# Patient Record
Sex: MALE | Race: WHITE | Employment: FULL TIME | URBAN - METROPOLITAN AREA
[De-identification: names, ages, dates, MRNs, and addresses within clinical notes are randomized per-mention and may not be internally consistent; named-entity substitution may affect disease eponyms.]

---

## 2017-01-04 ENCOUNTER — COMMUNICATION - HEALTHEAST (OUTPATIENT)
Dept: FAMILY MEDICINE | Facility: CLINIC | Age: 52
End: 2017-01-04

## 2017-01-04 DIAGNOSIS — K21.00 REFLUX ESOPHAGITIS: ICD-10-CM

## 2017-01-05 ENCOUNTER — COMMUNICATION - HEALTHEAST (OUTPATIENT)
Dept: FAMILY MEDICINE | Facility: CLINIC | Age: 52
End: 2017-01-05

## 2017-01-05 ENCOUNTER — AMBULATORY - HEALTHEAST (OUTPATIENT)
Dept: NURSING | Facility: CLINIC | Age: 52
End: 2017-01-05

## 2017-01-05 DIAGNOSIS — K21.00 REFLUX ESOPHAGITIS: ICD-10-CM

## 2017-01-05 DIAGNOSIS — I26.99 PULMONARY EMBOLISM (H): ICD-10-CM

## 2017-02-07 ENCOUNTER — COMMUNICATION - HEALTHEAST (OUTPATIENT)
Dept: NURSING | Facility: CLINIC | Age: 52
End: 2017-02-07

## 2017-02-14 ENCOUNTER — RECORDS - HEALTHEAST (OUTPATIENT)
Dept: ADMINISTRATIVE | Facility: OTHER | Age: 52
End: 2017-02-14

## 2017-02-14 ENCOUNTER — AMBULATORY - HEALTHEAST (OUTPATIENT)
Dept: LAB | Facility: CLINIC | Age: 52
End: 2017-02-14

## 2017-02-14 ENCOUNTER — COMMUNICATION - HEALTHEAST (OUTPATIENT)
Dept: NURSING | Facility: CLINIC | Age: 52
End: 2017-02-14

## 2017-02-14 DIAGNOSIS — I26.99 OTHER PULMONARY EMBOLISM AND INFARCTION: ICD-10-CM

## 2017-02-28 ENCOUNTER — OFFICE VISIT - HEALTHEAST (OUTPATIENT)
Dept: PODIATRY | Age: 52
End: 2017-02-28

## 2017-02-28 DIAGNOSIS — E11.9 TYPE 2 DIABETES MELLITUS WITHOUT COMPLICATION, WITHOUT LONG-TERM CURRENT USE OF INSULIN (H): ICD-10-CM

## 2017-02-28 DIAGNOSIS — M76.61 ACHILLES TENDINITIS OF RIGHT LOWER EXTREMITY: ICD-10-CM

## 2017-03-02 ENCOUNTER — COMMUNICATION - HEALTHEAST (OUTPATIENT)
Dept: FAMILY MEDICINE | Facility: CLINIC | Age: 52
End: 2017-03-02

## 2017-03-02 DIAGNOSIS — I26.99 PULMONARY EMBOLISM (H): ICD-10-CM

## 2017-03-02 DIAGNOSIS — Z79.01 LONG TERM (CURRENT) USE OF ANTICOAGULANTS: ICD-10-CM

## 2017-03-07 ENCOUNTER — COMMUNICATION - HEALTHEAST (OUTPATIENT)
Dept: FAMILY MEDICINE | Facility: CLINIC | Age: 52
End: 2017-03-07

## 2017-03-16 ENCOUNTER — COMMUNICATION - HEALTHEAST (OUTPATIENT)
Dept: NURSING | Facility: CLINIC | Age: 52
End: 2017-03-16

## 2017-03-16 DIAGNOSIS — I26.99 PULMONARY EMBOLISM (H): ICD-10-CM

## 2017-03-31 ENCOUNTER — COMMUNICATION - HEALTHEAST (OUTPATIENT)
Dept: FAMILY MEDICINE | Facility: CLINIC | Age: 52
End: 2017-03-31

## 2017-03-31 DIAGNOSIS — I26.99 PULMONARY EMBOLISM (H): ICD-10-CM

## 2017-03-31 DIAGNOSIS — K21.00 REFLUX ESOPHAGITIS: ICD-10-CM

## 2017-04-18 ENCOUNTER — AMBULATORY - HEALTHEAST (OUTPATIENT)
Dept: LAB | Facility: CLINIC | Age: 52
End: 2017-04-18

## 2017-04-18 ENCOUNTER — COMMUNICATION - HEALTHEAST (OUTPATIENT)
Dept: NURSING | Facility: CLINIC | Age: 52
End: 2017-04-18

## 2017-04-18 DIAGNOSIS — I26.99 PULMONARY EMBOLISM (H): ICD-10-CM

## 2017-06-06 ENCOUNTER — COMMUNICATION - HEALTHEAST (OUTPATIENT)
Dept: NURSING | Facility: CLINIC | Age: 52
End: 2017-06-06

## 2017-06-07 ENCOUNTER — AMBULATORY - HEALTHEAST (OUTPATIENT)
Dept: FAMILY MEDICINE | Facility: CLINIC | Age: 52
End: 2017-06-07

## 2017-06-07 ENCOUNTER — COMMUNICATION - HEALTHEAST (OUTPATIENT)
Dept: NURSING | Facility: CLINIC | Age: 52
End: 2017-06-07

## 2017-06-07 ENCOUNTER — AMBULATORY - HEALTHEAST (OUTPATIENT)
Dept: LAB | Facility: CLINIC | Age: 52
End: 2017-06-07

## 2017-06-07 DIAGNOSIS — E55.9 VITAMIN D DEFICIENCY: ICD-10-CM

## 2017-06-07 DIAGNOSIS — I26.99 PULMONARY EMBOLISM (H): ICD-10-CM

## 2017-06-10 ENCOUNTER — COMMUNICATION - HEALTHEAST (OUTPATIENT)
Dept: FAMILY MEDICINE | Facility: CLINIC | Age: 52
End: 2017-06-10

## 2017-07-26 ENCOUNTER — COMMUNICATION - HEALTHEAST (OUTPATIENT)
Dept: NURSING | Facility: CLINIC | Age: 52
End: 2017-07-26

## 2017-08-01 ENCOUNTER — COMMUNICATION - HEALTHEAST (OUTPATIENT)
Dept: FAMILY MEDICINE | Facility: CLINIC | Age: 52
End: 2017-08-01

## 2017-08-01 DIAGNOSIS — Z79.01 LONG TERM (CURRENT) USE OF ANTICOAGULANTS: ICD-10-CM

## 2017-08-01 DIAGNOSIS — I26.99 PULMONARY EMBOLISM (H): ICD-10-CM

## 2017-08-03 ENCOUNTER — AMBULATORY - HEALTHEAST (OUTPATIENT)
Dept: LAB | Facility: CLINIC | Age: 52
End: 2017-08-03

## 2017-08-03 ENCOUNTER — COMMUNICATION - HEALTHEAST (OUTPATIENT)
Dept: NURSING | Facility: CLINIC | Age: 52
End: 2017-08-03

## 2017-08-03 DIAGNOSIS — I26.99 PULMONARY EMBOLISM (H): ICD-10-CM

## 2017-09-07 ENCOUNTER — COMMUNICATION - HEALTHEAST (OUTPATIENT)
Dept: NURSING | Facility: CLINIC | Age: 52
End: 2017-09-07

## 2017-09-22 ENCOUNTER — AMBULATORY - HEALTHEAST (OUTPATIENT)
Dept: LAB | Facility: CLINIC | Age: 52
End: 2017-09-22

## 2017-09-22 ENCOUNTER — COMMUNICATION - HEALTHEAST (OUTPATIENT)
Dept: NURSING | Facility: CLINIC | Age: 52
End: 2017-09-22

## 2017-09-22 DIAGNOSIS — I26.99 PULMONARY EMBOLISM (H): ICD-10-CM

## 2017-10-09 ENCOUNTER — AMBULATORY - HEALTHEAST (OUTPATIENT)
Dept: LAB | Facility: CLINIC | Age: 52
End: 2017-10-09

## 2017-10-09 ENCOUNTER — COMMUNICATION - HEALTHEAST (OUTPATIENT)
Dept: NURSING | Facility: CLINIC | Age: 52
End: 2017-10-09

## 2017-10-09 DIAGNOSIS — I26.99 PULMONARY EMBOLISM (H): ICD-10-CM

## 2017-10-30 ENCOUNTER — COMMUNICATION - HEALTHEAST (OUTPATIENT)
Dept: NURSING | Facility: CLINIC | Age: 52
End: 2017-10-30

## 2017-10-30 ENCOUNTER — AMBULATORY - HEALTHEAST (OUTPATIENT)
Dept: LAB | Facility: CLINIC | Age: 52
End: 2017-10-30

## 2017-10-30 DIAGNOSIS — I26.99 PULMONARY EMBOLISM (H): ICD-10-CM

## 2017-10-31 ENCOUNTER — COMMUNICATION - HEALTHEAST (OUTPATIENT)
Dept: FAMILY MEDICINE | Facility: CLINIC | Age: 52
End: 2017-10-31

## 2017-10-31 DIAGNOSIS — I26.99 PULMONARY EMBOLISM (H): ICD-10-CM

## 2017-11-03 ENCOUNTER — OFFICE VISIT - HEALTHEAST (OUTPATIENT)
Dept: FAMILY MEDICINE | Facility: CLINIC | Age: 52
End: 2017-11-03

## 2017-11-03 DIAGNOSIS — I26.99 PULMONARY EMBOLISM (H): ICD-10-CM

## 2017-11-03 DIAGNOSIS — K21.00 REFLUX ESOPHAGITIS: ICD-10-CM

## 2017-11-03 DIAGNOSIS — Z00.00 HEALTH CARE MAINTENANCE: ICD-10-CM

## 2017-11-03 DIAGNOSIS — E78.5 HYPERLIPIDEMIA: ICD-10-CM

## 2017-11-03 DIAGNOSIS — I10 ESSENTIAL HYPERTENSION: ICD-10-CM

## 2017-11-03 DIAGNOSIS — Z79.01 LONG TERM CURRENT USE OF ANTICOAGULANT THERAPY: ICD-10-CM

## 2017-11-03 DIAGNOSIS — E11.9 TYPE II DIABETES MELLITUS (H): ICD-10-CM

## 2017-11-03 LAB
CHOLEST SERPL-MCNC: 156 MG/DL
FASTING STATUS PATIENT QL REPORTED: YES
HBA1C MFR BLD: 7.2 % (ref 3.5–6)
HDLC SERPL-MCNC: 53 MG/DL
LDLC SERPL CALC-MCNC: 79 MG/DL
PSA SERPL-MCNC: 0.5 NG/ML (ref 0–3.5)
TRIGL SERPL-MCNC: 120 MG/DL

## 2017-11-03 ASSESSMENT — MIFFLIN-ST. JEOR: SCORE: 2312.99

## 2017-11-07 ENCOUNTER — COMMUNICATION - HEALTHEAST (OUTPATIENT)
Dept: FAMILY MEDICINE | Facility: CLINIC | Age: 52
End: 2017-11-07

## 2017-11-07 ENCOUNTER — RECORDS - HEALTHEAST (OUTPATIENT)
Dept: ADMINISTRATIVE | Facility: OTHER | Age: 52
End: 2017-11-07

## 2017-11-28 ENCOUNTER — COMMUNICATION - HEALTHEAST (OUTPATIENT)
Dept: NURSING | Facility: CLINIC | Age: 52
End: 2017-11-28

## 2017-11-30 ENCOUNTER — COMMUNICATION - HEALTHEAST (OUTPATIENT)
Dept: FAMILY MEDICINE | Facility: CLINIC | Age: 52
End: 2017-11-30

## 2017-11-30 DIAGNOSIS — E11.9 TYPE II DIABETES MELLITUS (H): ICD-10-CM

## 2017-11-30 DIAGNOSIS — I10 ESSENTIAL HYPERTENSION: ICD-10-CM

## 2017-11-30 DIAGNOSIS — E78.5 HYPERLIPIDEMIA: ICD-10-CM

## 2017-12-04 ENCOUNTER — AMBULATORY - HEALTHEAST (OUTPATIENT)
Dept: LAB | Facility: CLINIC | Age: 52
End: 2017-12-04

## 2017-12-04 ENCOUNTER — COMMUNICATION - HEALTHEAST (OUTPATIENT)
Dept: NURSING | Facility: CLINIC | Age: 52
End: 2017-12-04

## 2017-12-04 DIAGNOSIS — I26.99 PULMONARY EMBOLISM (H): ICD-10-CM

## 2018-01-15 ENCOUNTER — COMMUNICATION - HEALTHEAST (OUTPATIENT)
Dept: NURSING | Facility: CLINIC | Age: 53
End: 2018-01-15

## 2018-01-18 ENCOUNTER — AMBULATORY - HEALTHEAST (OUTPATIENT)
Dept: LAB | Facility: CLINIC | Age: 53
End: 2018-01-18

## 2018-01-18 ENCOUNTER — COMMUNICATION - HEALTHEAST (OUTPATIENT)
Dept: NURSING | Facility: CLINIC | Age: 53
End: 2018-01-18

## 2018-01-18 DIAGNOSIS — I26.99 PULMONARY EMBOLISM (H): ICD-10-CM

## 2018-01-18 LAB — INR PPP: 2.2 (ref 0.9–1.1)

## 2018-01-28 ENCOUNTER — COMMUNICATION - HEALTHEAST (OUTPATIENT)
Dept: FAMILY MEDICINE | Facility: CLINIC | Age: 53
End: 2018-01-28

## 2018-01-28 DIAGNOSIS — I26.99 PULMONARY EMBOLISM (H): ICD-10-CM

## 2018-01-28 DIAGNOSIS — Z79.01 LONG-TERM (CURRENT) USE OF ANTICOAGULANTS: ICD-10-CM

## 2018-02-13 ENCOUNTER — COMMUNICATION - HEALTHEAST (OUTPATIENT)
Dept: NURSING | Facility: CLINIC | Age: 53
End: 2018-02-13

## 2018-02-13 DIAGNOSIS — I26.99 PULMONARY EMBOLISM (H): ICD-10-CM

## 2018-03-08 ENCOUNTER — COMMUNICATION - HEALTHEAST (OUTPATIENT)
Dept: NURSING | Facility: CLINIC | Age: 53
End: 2018-03-08

## 2018-03-15 ENCOUNTER — AMBULATORY - HEALTHEAST (OUTPATIENT)
Dept: LAB | Facility: CLINIC | Age: 53
End: 2018-03-15

## 2018-03-15 ENCOUNTER — COMMUNICATION - HEALTHEAST (OUTPATIENT)
Dept: NURSING | Facility: CLINIC | Age: 53
End: 2018-03-15

## 2018-03-15 DIAGNOSIS — I26.99 PULMONARY EMBOLISM (H): ICD-10-CM

## 2018-03-15 LAB — INR PPP: 3.3 (ref 0.9–1.1)

## 2018-04-05 ENCOUNTER — COMMUNICATION - HEALTHEAST (OUTPATIENT)
Dept: ANTICOAGULATION | Facility: CLINIC | Age: 53
End: 2018-04-05

## 2018-04-05 ENCOUNTER — AMBULATORY - HEALTHEAST (OUTPATIENT)
Dept: LAB | Facility: CLINIC | Age: 53
End: 2018-04-05

## 2018-04-05 DIAGNOSIS — I26.99 PULMONARY EMBOLISM (H): ICD-10-CM

## 2018-04-05 LAB — INR PPP: 2.4 (ref 0.9–1.1)

## 2018-04-26 ENCOUNTER — COMMUNICATION - HEALTHEAST (OUTPATIENT)
Dept: ANTICOAGULATION | Facility: CLINIC | Age: 53
End: 2018-04-26

## 2018-05-08 ENCOUNTER — AMBULATORY - HEALTHEAST (OUTPATIENT)
Dept: LAB | Facility: CLINIC | Age: 53
End: 2018-05-08

## 2018-05-08 ENCOUNTER — COMMUNICATION - HEALTHEAST (OUTPATIENT)
Dept: ANTICOAGULATION | Facility: CLINIC | Age: 53
End: 2018-05-08

## 2018-05-08 DIAGNOSIS — I26.99 PULMONARY EMBOLISM (H): ICD-10-CM

## 2018-05-08 LAB — INR PPP: 2.9 (ref 0.9–1.1)

## 2018-05-14 ENCOUNTER — RECORDS - HEALTHEAST (OUTPATIENT)
Dept: ADMINISTRATIVE | Facility: OTHER | Age: 53
End: 2018-05-14

## 2018-05-14 LAB — RETINOPATHY: NEGATIVE

## 2018-05-15 ENCOUNTER — COMMUNICATION - HEALTHEAST (OUTPATIENT)
Dept: FAMILY MEDICINE | Facility: CLINIC | Age: 53
End: 2018-05-15

## 2018-06-12 ENCOUNTER — COMMUNICATION - HEALTHEAST (OUTPATIENT)
Dept: ANTICOAGULATION | Facility: CLINIC | Age: 53
End: 2018-06-12

## 2018-06-27 ENCOUNTER — OFFICE VISIT - HEALTHEAST (OUTPATIENT)
Dept: FAMILY MEDICINE | Facility: CLINIC | Age: 53
End: 2018-06-27

## 2018-06-27 ENCOUNTER — COMMUNICATION - HEALTHEAST (OUTPATIENT)
Dept: ANTICOAGULATION | Facility: CLINIC | Age: 53
End: 2018-06-27

## 2018-06-27 DIAGNOSIS — E11.9 DIABETES MELLITUS (H): ICD-10-CM

## 2018-06-27 DIAGNOSIS — J45.909 ASTHMA: ICD-10-CM

## 2018-06-27 DIAGNOSIS — I26.99 PULMONARY EMBOLISM (H): ICD-10-CM

## 2018-06-27 DIAGNOSIS — Z12.11 SPECIAL SCREENING FOR MALIGNANT NEOPLASMS, COLON: ICD-10-CM

## 2018-06-27 LAB
HBA1C MFR BLD: 7.3 % (ref 3.5–6)
INR PPP: 3.6 (ref 0.9–1.1)

## 2018-07-09 ENCOUNTER — AMBULATORY - HEALTHEAST (OUTPATIENT)
Dept: LAB | Facility: CLINIC | Age: 53
End: 2018-07-09

## 2018-07-09 ENCOUNTER — COMMUNICATION - HEALTHEAST (OUTPATIENT)
Dept: ANTICOAGULATION | Facility: CLINIC | Age: 53
End: 2018-07-09

## 2018-07-09 DIAGNOSIS — I26.99 PULMONARY EMBOLISM (H): ICD-10-CM

## 2018-07-09 LAB — INR PPP: 3.3 (ref 0.9–1.1)

## 2018-07-30 ENCOUNTER — COMMUNICATION - HEALTHEAST (OUTPATIENT)
Dept: FAMILY MEDICINE | Facility: CLINIC | Age: 53
End: 2018-07-30

## 2018-07-30 DIAGNOSIS — I26.99 PULMONARY EMBOLISM (H): ICD-10-CM

## 2018-07-30 DIAGNOSIS — Z79.01 LONG-TERM (CURRENT) USE OF ANTICOAGULANTS: ICD-10-CM

## 2018-08-01 ENCOUNTER — COMMUNICATION - HEALTHEAST (OUTPATIENT)
Dept: ANTICOAGULATION | Facility: CLINIC | Age: 53
End: 2018-08-01

## 2018-08-01 ENCOUNTER — AMBULATORY - HEALTHEAST (OUTPATIENT)
Dept: LAB | Facility: CLINIC | Age: 53
End: 2018-08-01

## 2018-08-01 DIAGNOSIS — I26.99 PULMONARY EMBOLISM (H): ICD-10-CM

## 2018-08-01 LAB — INR PPP: 2.6 (ref 0.9–1.1)

## 2018-08-22 ENCOUNTER — COMMUNICATION - HEALTHEAST (OUTPATIENT)
Dept: ANTICOAGULATION | Facility: CLINIC | Age: 53
End: 2018-08-22

## 2018-08-28 ENCOUNTER — COMMUNICATION - HEALTHEAST (OUTPATIENT)
Dept: ANTICOAGULATION | Facility: CLINIC | Age: 53
End: 2018-08-28

## 2018-08-28 ENCOUNTER — AMBULATORY - HEALTHEAST (OUTPATIENT)
Dept: LAB | Facility: CLINIC | Age: 53
End: 2018-08-28

## 2018-08-28 DIAGNOSIS — I26.99 PULMONARY EMBOLISM (H): ICD-10-CM

## 2018-08-28 LAB — INR PPP: 2.6 (ref 0.9–1.1)

## 2018-10-02 ENCOUNTER — COMMUNICATION - HEALTHEAST (OUTPATIENT)
Dept: ANTICOAGULATION | Facility: CLINIC | Age: 53
End: 2018-10-02

## 2018-10-08 ENCOUNTER — AMBULATORY - HEALTHEAST (OUTPATIENT)
Dept: LAB | Facility: CLINIC | Age: 53
End: 2018-10-08

## 2018-10-08 ENCOUNTER — COMMUNICATION - HEALTHEAST (OUTPATIENT)
Dept: ANTICOAGULATION | Facility: CLINIC | Age: 53
End: 2018-10-08

## 2018-10-08 DIAGNOSIS — I26.99 PULMONARY EMBOLISM (H): ICD-10-CM

## 2018-10-08 LAB — INR PPP: 2.3 (ref 0.9–1.1)

## 2018-10-23 ENCOUNTER — RECORDS - HEALTHEAST (OUTPATIENT)
Dept: ADMINISTRATIVE | Facility: OTHER | Age: 53
End: 2018-10-23

## 2018-11-13 ENCOUNTER — COMMUNICATION - HEALTHEAST (OUTPATIENT)
Dept: ANTICOAGULATION | Facility: CLINIC | Age: 53
End: 2018-11-13

## 2018-11-27 ENCOUNTER — COMMUNICATION - HEALTHEAST (OUTPATIENT)
Dept: ANTICOAGULATION | Facility: CLINIC | Age: 53
End: 2018-11-27

## 2018-11-27 ENCOUNTER — OFFICE VISIT - HEALTHEAST (OUTPATIENT)
Dept: FAMILY MEDICINE | Facility: CLINIC | Age: 53
End: 2018-11-27

## 2018-11-27 DIAGNOSIS — I10 ESSENTIAL HYPERTENSION: ICD-10-CM

## 2018-11-27 DIAGNOSIS — E78.5 HYPERLIPIDEMIA: ICD-10-CM

## 2018-11-27 DIAGNOSIS — I26.99 PULMONARY EMBOLISM (H): ICD-10-CM

## 2018-11-27 DIAGNOSIS — E11.9 TYPE II DIABETES MELLITUS (H): ICD-10-CM

## 2018-11-27 DIAGNOSIS — Z79.01 LONG TERM CURRENT USE OF ANTICOAGULANT THERAPY: ICD-10-CM

## 2018-11-27 DIAGNOSIS — Z00.00 HEALTH MAINTENANCE EXAMINATION: ICD-10-CM

## 2018-11-27 DIAGNOSIS — K21.00 REFLUX ESOPHAGITIS: ICD-10-CM

## 2018-11-27 DIAGNOSIS — Z86.711 HISTORY OF PULMONARY EMBOLISM: ICD-10-CM

## 2018-11-27 LAB
ALBUMIN SERPL-MCNC: 4.1 G/DL (ref 3.5–5)
ALP SERPL-CCNC: 85 U/L (ref 45–120)
ALT SERPL W P-5'-P-CCNC: 17 U/L (ref 0–45)
ANION GAP SERPL CALCULATED.3IONS-SCNC: 11 MMOL/L (ref 5–18)
AST SERPL W P-5'-P-CCNC: 19 U/L (ref 0–40)
BILIRUB SERPL-MCNC: 0.8 MG/DL (ref 0–1)
BUN SERPL-MCNC: 14 MG/DL (ref 8–22)
CALCIUM SERPL-MCNC: 9.8 MG/DL (ref 8.5–10.5)
CHLORIDE BLD-SCNC: 105 MMOL/L (ref 98–107)
CHOLEST SERPL-MCNC: 144 MG/DL
CO2 SERPL-SCNC: 25 MMOL/L (ref 22–31)
CREAT SERPL-MCNC: 0.77 MG/DL (ref 0.7–1.3)
CREAT UR-MCNC: 202.3 MG/DL
FASTING STATUS PATIENT QL REPORTED: NORMAL
GFR SERPL CREATININE-BSD FRML MDRD: >60 ML/MIN/1.73M2
GLUCOSE BLD-MCNC: 121 MG/DL (ref 70–125)
HBA1C MFR BLD: 7 % (ref 3.5–6)
HDLC SERPL-MCNC: 55 MG/DL
INR PPP: 2.1 (ref 0.9–1.1)
LDLC SERPL CALC-MCNC: 62 MG/DL
MICROALBUMIN UR-MCNC: 1.41 MG/DL (ref 0–1.99)
MICROALBUMIN/CREAT UR: 7 MG/G
POTASSIUM BLD-SCNC: 4.2 MMOL/L (ref 3.5–5)
PROT SERPL-MCNC: 6.8 G/DL (ref 6–8)
PSA SERPL-MCNC: 0.4 NG/ML (ref 0–3.5)
SODIUM SERPL-SCNC: 141 MMOL/L (ref 136–145)
TRIGL SERPL-MCNC: 134 MG/DL

## 2018-11-27 ASSESSMENT — MIFFLIN-ST. JEOR: SCORE: 2256.48

## 2018-11-28 LAB — 25(OH)D3 SERPL-MCNC: 27 NG/ML (ref 30–80)

## 2018-11-30 ENCOUNTER — COMMUNICATION - HEALTHEAST (OUTPATIENT)
Dept: FAMILY MEDICINE | Facility: CLINIC | Age: 53
End: 2018-11-30

## 2019-01-15 ENCOUNTER — COMMUNICATION - HEALTHEAST (OUTPATIENT)
Dept: ANTICOAGULATION | Facility: CLINIC | Age: 54
End: 2019-01-15

## 2019-01-24 ENCOUNTER — AMBULATORY - HEALTHEAST (OUTPATIENT)
Dept: LAB | Facility: CLINIC | Age: 54
End: 2019-01-24

## 2019-01-24 ENCOUNTER — COMMUNICATION - HEALTHEAST (OUTPATIENT)
Dept: ANTICOAGULATION | Facility: CLINIC | Age: 54
End: 2019-01-24

## 2019-01-24 DIAGNOSIS — I26.99 PULMONARY EMBOLISM (H): ICD-10-CM

## 2019-01-24 LAB — INR PPP: 3.1 (ref 0.9–1.1)

## 2019-02-14 ENCOUNTER — COMMUNICATION - HEALTHEAST (OUTPATIENT)
Dept: ANTICOAGULATION | Facility: CLINIC | Age: 54
End: 2019-02-14

## 2019-03-15 ENCOUNTER — AMBULATORY - HEALTHEAST (OUTPATIENT)
Dept: LAB | Facility: CLINIC | Age: 54
End: 2019-03-15

## 2019-03-15 ENCOUNTER — COMMUNICATION - HEALTHEAST (OUTPATIENT)
Dept: ANTICOAGULATION | Facility: CLINIC | Age: 54
End: 2019-03-15

## 2019-03-15 DIAGNOSIS — I26.99 PULMONARY EMBOLISM (H): ICD-10-CM

## 2019-03-15 LAB — INR PPP: 1.8 (ref 0.9–1.1)

## 2019-03-28 ENCOUNTER — RECORDS - HEALTHEAST (OUTPATIENT)
Dept: HEALTH INFORMATION MANAGEMENT | Facility: CLINIC | Age: 54
End: 2019-03-28

## 2019-04-03 ENCOUNTER — COMMUNICATION - HEALTHEAST (OUTPATIENT)
Dept: ANTICOAGULATION | Facility: CLINIC | Age: 54
End: 2019-04-03

## 2019-04-04 ENCOUNTER — OFFICE VISIT - HEALTHEAST (OUTPATIENT)
Dept: FAMILY MEDICINE | Facility: CLINIC | Age: 54
End: 2019-04-04

## 2019-04-04 DIAGNOSIS — R53.83 FATIGUE, UNSPECIFIED TYPE: ICD-10-CM

## 2019-04-04 DIAGNOSIS — I26.99 PULMONARY EMBOLISM (H): ICD-10-CM

## 2019-04-04 LAB
ALBUMIN SERPL-MCNC: 4.2 G/DL (ref 3.5–5)
ALP SERPL-CCNC: 88 U/L (ref 45–120)
ALT SERPL W P-5'-P-CCNC: 20 U/L (ref 0–45)
ANION GAP SERPL CALCULATED.3IONS-SCNC: 12 MMOL/L (ref 5–18)
AST SERPL W P-5'-P-CCNC: 17 U/L (ref 0–40)
BASOPHILS # BLD AUTO: 0 THOU/UL (ref 0–0.2)
BASOPHILS NFR BLD AUTO: 0 % (ref 0–2)
BILIRUB SERPL-MCNC: 0.6 MG/DL (ref 0–1)
BUN SERPL-MCNC: 16 MG/DL (ref 8–22)
CALCIUM SERPL-MCNC: 10 MG/DL (ref 8.5–10.5)
CHLORIDE BLD-SCNC: 103 MMOL/L (ref 98–107)
CO2 SERPL-SCNC: 22 MMOL/L (ref 22–31)
CREAT SERPL-MCNC: 0.86 MG/DL (ref 0.7–1.3)
EOSINOPHIL # BLD AUTO: 0.3 THOU/UL (ref 0–0.4)
EOSINOPHIL NFR BLD AUTO: 4 % (ref 0–6)
ERYTHROCYTE [DISTWIDTH] IN BLOOD BY AUTOMATED COUNT: 12.8 % (ref 11–14.5)
GFR SERPL CREATININE-BSD FRML MDRD: >60 ML/MIN/1.73M2
GLUCOSE BLD-MCNC: 202 MG/DL (ref 70–125)
HBA1C MFR BLD: 6.6 % (ref 3.5–6)
HCT VFR BLD AUTO: 42.6 % (ref 40–54)
HGB BLD-MCNC: 14 G/DL (ref 14–18)
INR PPP: 1 (ref 0.9–1.1)
LYMPHOCYTES # BLD AUTO: 0.9 THOU/UL (ref 0.8–4.4)
LYMPHOCYTES NFR BLD AUTO: 12 % (ref 20–40)
MCH RBC QN AUTO: 28.9 PG (ref 27–34)
MCHC RBC AUTO-ENTMCNC: 32.9 G/DL (ref 32–36)
MCV RBC AUTO: 88 FL (ref 80–100)
MONOCYTES # BLD AUTO: 0.5 THOU/UL (ref 0–0.9)
MONOCYTES NFR BLD AUTO: 7 % (ref 2–10)
NEUTROPHILS # BLD AUTO: 5.7 THOU/UL (ref 2–7.7)
NEUTROPHILS NFR BLD AUTO: 76 % (ref 50–70)
PLATELET # BLD AUTO: 262 THOU/UL (ref 140–440)
PMV BLD AUTO: 8.3 FL (ref 7–10)
POTASSIUM BLD-SCNC: 4.7 MMOL/L (ref 3.5–5)
PROT SERPL-MCNC: 7 G/DL (ref 6–8)
RBC # BLD AUTO: 4.84 MILL/UL (ref 4.4–6.2)
SODIUM SERPL-SCNC: 137 MMOL/L (ref 136–145)
TSH SERPL DL<=0.005 MIU/L-ACNC: 0.56 UIU/ML (ref 0.3–5)
VIT B12 SERPL-MCNC: 507 PG/ML (ref 213–816)
WBC: 7.5 THOU/UL (ref 4–11)

## 2019-04-05 LAB — 25(OH)D3 SERPL-MCNC: 33.4 NG/ML (ref 30–80)

## 2019-04-08 ENCOUNTER — COMMUNICATION - HEALTHEAST (OUTPATIENT)
Dept: FAMILY MEDICINE | Facility: CLINIC | Age: 54
End: 2019-04-08

## 2019-04-08 DIAGNOSIS — R53.82 CHRONIC FATIGUE: ICD-10-CM

## 2019-04-22 ENCOUNTER — HOSPITAL ENCOUNTER (OUTPATIENT)
Dept: NUCLEAR MEDICINE | Facility: HOSPITAL | Age: 54
Discharge: HOME | End: 2019-04-22
Attending: NURSE PRACTITIONER

## 2019-04-22 ENCOUNTER — HOSPITAL ENCOUNTER (OUTPATIENT)
Dept: CARDIOLOGY | Facility: HOSPITAL | Age: 54
Discharge: HOME | End: 2019-04-22
Attending: NURSE PRACTITIONER

## 2019-04-22 DIAGNOSIS — R53.82 CHRONIC FATIGUE: ICD-10-CM

## 2019-04-23 ENCOUNTER — HOSPITAL ENCOUNTER (OUTPATIENT)
Dept: NUCLEAR MEDICINE | Facility: HOSPITAL | Age: 54
Discharge: HOME | End: 2019-04-23
Attending: NURSE PRACTITIONER

## 2019-04-23 LAB
CV STRESS CURRENT BP HE: NORMAL
CV STRESS CURRENT HR HE: 102
CV STRESS CURRENT HR HE: 103
CV STRESS CURRENT HR HE: 108
CV STRESS CURRENT HR HE: 80
CV STRESS CURRENT HR HE: 81
CV STRESS CURRENT HR HE: 81
CV STRESS CURRENT HR HE: 86
CV STRESS CURRENT HR HE: 88
CV STRESS CURRENT HR HE: 90
CV STRESS CURRENT HR HE: 91
CV STRESS CURRENT HR HE: 92
CV STRESS CURRENT HR HE: 95
CV STRESS CURRENT HR HE: 96
CV STRESS CURRENT HR HE: 96
CV STRESS DEVIATION TIME HE: NORMAL
CV STRESS ECHO PERCENT HR HE: NORMAL
CV STRESS EXERCISE STAGE HE: NORMAL
CV STRESS FINAL RESTING BP HE: NORMAL
CV STRESS FINAL RESTING HR HE: 91
CV STRESS MAX HR HE: 112
CV STRESS MAX TREADMILL GRADE HE: 0
CV STRESS MAX TREADMILL SPEED HE: 0
CV STRESS PEAK DIA BP HE: NORMAL
CV STRESS PEAK SYS BP HE: NORMAL
CV STRESS PHASE HE: NORMAL
CV STRESS PROTOCOL HE: NORMAL
CV STRESS RESTING PT POSITION HE: NORMAL
CV STRESS ST DEVIATION AMOUNT HE: NORMAL
CV STRESS ST DEVIATION ELEVATION HE: NORMAL
CV STRESS ST EVELATION AMOUNT HE: NORMAL
CV STRESS TEST TYPE HE: NORMAL
CV STRESS TOTAL STAGE TIME MIN 1 HE: NORMAL
NUC STRESS EJECTION FRACTION: 56 %
STRESS ECHO BASELINE BP: NORMAL
STRESS ECHO BASELINE HR: 78
STRESS ECHO CALCULATED PERCENT HR: 67 %
STRESS ECHO LAST STRESS BP: NORMAL
STRESS ECHO LAST STRESS HR: 96

## 2019-04-26 ENCOUNTER — COMMUNICATION - HEALTHEAST (OUTPATIENT)
Dept: FAMILY MEDICINE | Facility: CLINIC | Age: 54
End: 2019-04-26

## 2019-04-29 ENCOUNTER — COMMUNICATION - HEALTHEAST (OUTPATIENT)
Dept: FAMILY MEDICINE | Facility: CLINIC | Age: 54
End: 2019-04-29

## 2019-05-14 ENCOUNTER — RECORDS - HEALTHEAST (OUTPATIENT)
Dept: ADMINISTRATIVE | Facility: OTHER | Age: 54
End: 2019-05-14

## 2019-05-14 LAB — RETINOPATHY: NEGATIVE

## 2019-08-24 ENCOUNTER — COMMUNICATION - HEALTHEAST (OUTPATIENT)
Dept: FAMILY MEDICINE | Facility: CLINIC | Age: 54
End: 2019-08-24

## 2019-08-24 DIAGNOSIS — I26.99 OTHER PULMONARY EMBOLISM WITHOUT ACUTE COR PULMONALE, UNSPECIFIED CHRONICITY (H): ICD-10-CM

## 2019-11-17 ENCOUNTER — COMMUNICATION - HEALTHEAST (OUTPATIENT)
Dept: FAMILY MEDICINE | Facility: CLINIC | Age: 54
End: 2019-11-17

## 2019-11-17 DIAGNOSIS — E11.9 TYPE II DIABETES MELLITUS (H): ICD-10-CM

## 2019-11-17 DIAGNOSIS — I10 ESSENTIAL HYPERTENSION: ICD-10-CM

## 2019-11-17 DIAGNOSIS — I26.99 OTHER PULMONARY EMBOLISM WITHOUT ACUTE COR PULMONALE, UNSPECIFIED CHRONICITY (H): ICD-10-CM

## 2019-11-17 DIAGNOSIS — E78.5 HYPERLIPIDEMIA: ICD-10-CM

## 2019-11-17 DIAGNOSIS — K21.00 REFLUX ESOPHAGITIS: ICD-10-CM

## 2019-12-02 ENCOUNTER — OFFICE VISIT - HEALTHEAST (OUTPATIENT)
Dept: FAMILY MEDICINE | Facility: CLINIC | Age: 54
End: 2019-12-02

## 2019-12-02 DIAGNOSIS — J45.909 PERSISTENT ASTHMA WITHOUT COMPLICATION, UNSPECIFIED ASTHMA SEVERITY: ICD-10-CM

## 2019-12-02 DIAGNOSIS — E66.813 CLASS 3 SEVERE OBESITY DUE TO EXCESS CALORIES WITHOUT SERIOUS COMORBIDITY WITH BODY MASS INDEX (BMI) OF 40.0 TO 44.9 IN ADULT (H): ICD-10-CM

## 2019-12-02 DIAGNOSIS — K21.00 REFLUX ESOPHAGITIS: ICD-10-CM

## 2019-12-02 DIAGNOSIS — Z12.11 SPECIAL SCREENING FOR MALIGNANT NEOPLASMS, COLON: ICD-10-CM

## 2019-12-02 DIAGNOSIS — Z00.00 HEALTH CARE MAINTENANCE: ICD-10-CM

## 2019-12-02 DIAGNOSIS — Z12.5 SCREENING PSA (PROSTATE SPECIFIC ANTIGEN): ICD-10-CM

## 2019-12-02 DIAGNOSIS — I26.99 PULMONARY EMBOLISM WITHOUT ACUTE COR PULMONALE, UNSPECIFIED CHRONICITY, UNSPECIFIED PULMONARY EMBOLISM TYPE (H): ICD-10-CM

## 2019-12-02 DIAGNOSIS — Z51.81 ENCOUNTER FOR MONITORING STATIN THERAPY: ICD-10-CM

## 2019-12-02 DIAGNOSIS — E66.01 CLASS 3 SEVERE OBESITY DUE TO EXCESS CALORIES WITHOUT SERIOUS COMORBIDITY WITH BODY MASS INDEX (BMI) OF 40.0 TO 44.9 IN ADULT (H): ICD-10-CM

## 2019-12-02 DIAGNOSIS — E11.9 TYPE 2 DIABETES MELLITUS WITHOUT COMPLICATION, WITHOUT LONG-TERM CURRENT USE OF INSULIN (H): ICD-10-CM

## 2019-12-02 DIAGNOSIS — I10 ESSENTIAL HYPERTENSION: ICD-10-CM

## 2019-12-02 DIAGNOSIS — Z79.899 ENCOUNTER FOR MONITORING STATIN THERAPY: ICD-10-CM

## 2019-12-02 LAB
ALBUMIN SERPL-MCNC: 4.2 G/DL (ref 3.5–5)
ALP SERPL-CCNC: 86 U/L (ref 45–120)
ALT SERPL W P-5'-P-CCNC: 23 U/L (ref 0–45)
ANION GAP SERPL CALCULATED.3IONS-SCNC: 11 MMOL/L (ref 5–18)
AST SERPL W P-5'-P-CCNC: 18 U/L (ref 0–40)
BILIRUB SERPL-MCNC: 0.9 MG/DL (ref 0–1)
BUN SERPL-MCNC: 13 MG/DL (ref 8–22)
CALCIUM SERPL-MCNC: 9.4 MG/DL (ref 8.5–10.5)
CHLORIDE BLD-SCNC: 106 MMOL/L (ref 98–107)
CHOLEST SERPL-MCNC: 139 MG/DL
CO2 SERPL-SCNC: 24 MMOL/L (ref 22–31)
CREAT SERPL-MCNC: 0.83 MG/DL (ref 0.7–1.3)
CREAT UR-MCNC: 225.3 MG/DL
FASTING STATUS PATIENT QL REPORTED: YES
GFR SERPL CREATININE-BSD FRML MDRD: >60 ML/MIN/1.73M2
GLUCOSE BLD-MCNC: 136 MG/DL (ref 70–125)
HBA1C MFR BLD: 7.1 % (ref 3.5–6)
HDLC SERPL-MCNC: 63 MG/DL
LDLC SERPL CALC-MCNC: 57 MG/DL
MICROALBUMIN UR-MCNC: 2.32 MG/DL (ref 0–1.99)
MICROALBUMIN/CREAT UR: 10.3 MG/G
POTASSIUM BLD-SCNC: 4.2 MMOL/L (ref 3.5–5)
PROT SERPL-MCNC: 6.6 G/DL (ref 6–8)
PSA SERPL-MCNC: 0.5 NG/ML (ref 0–3.5)
SODIUM SERPL-SCNC: 141 MMOL/L (ref 136–145)
TRIGL SERPL-MCNC: 94 MG/DL

## 2019-12-02 ASSESSMENT — MIFFLIN-ST. JEOR: SCORE: 2285.15

## 2019-12-03 LAB — 25(OH)D3 SERPL-MCNC: 29.8 NG/ML (ref 30–80)

## 2019-12-04 ENCOUNTER — COMMUNICATION - HEALTHEAST (OUTPATIENT)
Dept: FAMILY MEDICINE | Facility: CLINIC | Age: 54
End: 2019-12-04

## 2019-12-16 ENCOUNTER — RECORDS - HEALTHEAST (OUTPATIENT)
Dept: ADMINISTRATIVE | Facility: OTHER | Age: 54
End: 2019-12-16

## 2019-12-16 LAB — COLOGUARD-ABSTRACT: NEGATIVE

## 2019-12-18 ENCOUNTER — RECORDS - HEALTHEAST (OUTPATIENT)
Dept: HEALTH INFORMATION MANAGEMENT | Facility: CLINIC | Age: 54
End: 2019-12-18

## 2019-12-26 ENCOUNTER — COMMUNICATION - HEALTHEAST (OUTPATIENT)
Dept: FAMILY MEDICINE | Facility: CLINIC | Age: 54
End: 2019-12-26

## 2019-12-27 ENCOUNTER — RECORDS - HEALTHEAST (OUTPATIENT)
Dept: HEALTH INFORMATION MANAGEMENT | Facility: CLINIC | Age: 54
End: 2019-12-27

## 2019-12-27 ENCOUNTER — COMMUNICATION - HEALTHEAST (OUTPATIENT)
Dept: FAMILY MEDICINE | Facility: CLINIC | Age: 54
End: 2019-12-27

## 2020-02-20 ENCOUNTER — COMMUNICATION - HEALTHEAST (OUTPATIENT)
Dept: FAMILY MEDICINE | Facility: CLINIC | Age: 55
End: 2020-02-20

## 2020-02-20 DIAGNOSIS — E78.5 HYPERLIPIDEMIA: ICD-10-CM

## 2020-02-20 DIAGNOSIS — I26.99 OTHER PULMONARY EMBOLISM WITHOUT ACUTE COR PULMONALE, UNSPECIFIED CHRONICITY (H): ICD-10-CM

## 2020-02-20 DIAGNOSIS — K21.00 REFLUX ESOPHAGITIS: ICD-10-CM

## 2020-02-20 DIAGNOSIS — E11.9 TYPE II DIABETES MELLITUS (H): ICD-10-CM

## 2020-02-20 DIAGNOSIS — I10 ESSENTIAL HYPERTENSION: ICD-10-CM

## 2020-11-05 ENCOUNTER — COMMUNICATION - HEALTHEAST (OUTPATIENT)
Dept: LAB | Facility: CLINIC | Age: 55
End: 2020-11-05

## 2020-11-11 ENCOUNTER — AMBULATORY - HEALTHEAST (OUTPATIENT)
Dept: LAB | Facility: CLINIC | Age: 55
End: 2020-11-11

## 2020-11-11 ENCOUNTER — AMBULATORY - HEALTHEAST (OUTPATIENT)
Dept: FAMILY MEDICINE | Facility: CLINIC | Age: 55
End: 2020-11-11

## 2020-11-11 DIAGNOSIS — Z00.00 HEALTH CARE MAINTENANCE: ICD-10-CM

## 2020-11-11 DIAGNOSIS — E11.9 TYPE 2 DIABETES MELLITUS WITHOUT COMPLICATION, WITHOUT LONG-TERM CURRENT USE OF INSULIN (H): ICD-10-CM

## 2020-11-11 LAB
ALBUMIN SERPL-MCNC: 4.1 G/DL (ref 3.5–5)
ALP SERPL-CCNC: 83 U/L (ref 45–120)
ALT SERPL W P-5'-P-CCNC: 19 U/L (ref 0–45)
ANION GAP SERPL CALCULATED.3IONS-SCNC: 9 MMOL/L (ref 5–18)
AST SERPL W P-5'-P-CCNC: 14 U/L (ref 0–40)
BILIRUB SERPL-MCNC: 0.8 MG/DL (ref 0–1)
BUN SERPL-MCNC: 16 MG/DL (ref 8–22)
CALCIUM SERPL-MCNC: 9.4 MG/DL (ref 8.5–10.5)
CHLORIDE BLD-SCNC: 104 MMOL/L (ref 98–107)
CHOLEST SERPL-MCNC: 138 MG/DL
CO2 SERPL-SCNC: 27 MMOL/L (ref 22–31)
CREAT SERPL-MCNC: 0.86 MG/DL (ref 0.7–1.3)
CREAT UR-MCNC: 147.1 MG/DL
FASTING STATUS PATIENT QL REPORTED: YES
GFR SERPL CREATININE-BSD FRML MDRD: >60 ML/MIN/1.73M2
GLUCOSE BLD-MCNC: 151 MG/DL (ref 70–125)
HBA1C MFR BLD: 7.9 %
HDLC SERPL-MCNC: 62 MG/DL
LDLC SERPL CALC-MCNC: 59 MG/DL
MICROALBUMIN UR-MCNC: 12.64 MG/DL (ref 0–1.99)
MICROALBUMIN/CREAT UR: 85.9 MG/G
POTASSIUM BLD-SCNC: 4.2 MMOL/L (ref 3.5–5)
PROT SERPL-MCNC: 6.4 G/DL (ref 6–8)
PSA SERPL-MCNC: 0.4 NG/ML (ref 0–3.5)
SODIUM SERPL-SCNC: 140 MMOL/L (ref 136–145)
TRIGL SERPL-MCNC: 87 MG/DL

## 2020-11-12 LAB — 25(OH)D3 SERPL-MCNC: 33.3 NG/ML (ref 30–80)

## 2020-11-17 ENCOUNTER — COMMUNICATION - HEALTHEAST (OUTPATIENT)
Dept: FAMILY MEDICINE | Facility: CLINIC | Age: 55
End: 2020-11-17

## 2020-12-03 ENCOUNTER — OFFICE VISIT - HEALTHEAST (OUTPATIENT)
Dept: FAMILY MEDICINE | Facility: CLINIC | Age: 55
End: 2020-12-03

## 2020-12-03 DIAGNOSIS — J45.909 PERSISTENT ASTHMA WITHOUT COMPLICATION, UNSPECIFIED ASTHMA SEVERITY: ICD-10-CM

## 2020-12-03 DIAGNOSIS — E66.01 MORBID OBESITY (H): ICD-10-CM

## 2020-12-03 DIAGNOSIS — E78.5 HYPERLIPIDEMIA: ICD-10-CM

## 2020-12-03 DIAGNOSIS — E11.9 TYPE II DIABETES MELLITUS (H): ICD-10-CM

## 2020-12-03 DIAGNOSIS — I10 ESSENTIAL HYPERTENSION: ICD-10-CM

## 2020-12-03 DIAGNOSIS — E78.5 HYPERLIPIDEMIA LDL GOAL <130: ICD-10-CM

## 2020-12-03 DIAGNOSIS — K21.00 REFLUX ESOPHAGITIS: ICD-10-CM

## 2020-12-03 DIAGNOSIS — I26.99 OTHER PULMONARY EMBOLISM WITHOUT ACUTE COR PULMONALE, UNSPECIFIED CHRONICITY (H): ICD-10-CM

## 2020-12-03 DIAGNOSIS — E11.9 TYPE 2 DIABETES MELLITUS WITHOUT COMPLICATION, WITHOUT LONG-TERM CURRENT USE OF INSULIN (H): ICD-10-CM

## 2020-12-03 RX ORDER — PIOGLITAZONEHYDROCHLORIDE 30 MG/1
TABLET ORAL
Qty: 90 TABLET | Refills: 3 | Status: SHIPPED | OUTPATIENT
Start: 2020-12-03 | End: 2021-12-13

## 2020-12-03 RX ORDER — LISINOPRIL 5 MG/1
5 TABLET ORAL DAILY
Qty: 90 TABLET | Refills: 3 | Status: SHIPPED | OUTPATIENT
Start: 2020-12-03 | End: 2021-12-13

## 2020-12-03 RX ORDER — ROSUVASTATIN CALCIUM 20 MG/1
20 TABLET, COATED ORAL DAILY
Qty: 90 TABLET | Refills: 3 | Status: SHIPPED | OUTPATIENT
Start: 2020-12-03 | End: 2021-12-13

## 2020-12-03 ASSESSMENT — MIFFLIN-ST. JEOR: SCORE: 2292.75

## 2021-01-05 ENCOUNTER — COMMUNICATION - HEALTHEAST (OUTPATIENT)
Dept: ADMINISTRATIVE | Facility: CLINIC | Age: 56
End: 2021-01-05

## 2021-01-06 ENCOUNTER — OFFICE VISIT - HEALTHEAST (OUTPATIENT)
Dept: FAMILY MEDICINE | Facility: CLINIC | Age: 56
End: 2021-01-06

## 2021-01-06 DIAGNOSIS — F41.8 SITUATIONAL ANXIETY: ICD-10-CM

## 2021-01-06 RX ORDER — HYDROXYZINE PAMOATE 50 MG/1
CAPSULE ORAL
Qty: 30 CAPSULE | Refills: 2 | Status: SHIPPED | OUTPATIENT
Start: 2021-01-06 | End: 2024-01-29

## 2021-01-06 ASSESSMENT — ANXIETY QUESTIONNAIRES
4. TROUBLE RELAXING: NEARLY EVERY DAY
GAD7 TOTAL SCORE: 21
6. BECOMING EASILY ANNOYED OR IRRITABLE: NEARLY EVERY DAY
IF YOU CHECKED OFF ANY PROBLEMS ON THIS QUESTIONNAIRE, HOW DIFFICULT HAVE THESE PROBLEMS MADE IT FOR YOU TO DO YOUR WORK, TAKE CARE OF THINGS AT HOME, OR GET ALONG WITH OTHER PEOPLE: VERY DIFFICULT
3. WORRYING TOO MUCH ABOUT DIFFERENT THINGS: NEARLY EVERY DAY
5. BEING SO RESTLESS THAT IT IS HARD TO SIT STILL: NEARLY EVERY DAY
2. NOT BEING ABLE TO STOP OR CONTROL WORRYING: NEARLY EVERY DAY
7. FEELING AFRAID AS IF SOMETHING AWFUL MIGHT HAPPEN: NEARLY EVERY DAY
1. FEELING NERVOUS, ANXIOUS, OR ON EDGE: NEARLY EVERY DAY

## 2021-01-20 ENCOUNTER — OFFICE VISIT - HEALTHEAST (OUTPATIENT)
Dept: FAMILY MEDICINE | Facility: CLINIC | Age: 56
End: 2021-01-20

## 2021-01-20 DIAGNOSIS — I10 ESSENTIAL HYPERTENSION: ICD-10-CM

## 2021-01-20 DIAGNOSIS — F41.8 SITUATIONAL ANXIETY: ICD-10-CM

## 2021-01-20 DIAGNOSIS — E11.9 TYPE 2 DIABETES MELLITUS WITHOUT COMPLICATION, WITHOUT LONG-TERM CURRENT USE OF INSULIN (H): ICD-10-CM

## 2021-01-20 ASSESSMENT — ANXIETY QUESTIONNAIRES
3. WORRYING TOO MUCH ABOUT DIFFERENT THINGS: NOT AT ALL
GAD7 TOTAL SCORE: 2
6. BECOMING EASILY ANNOYED OR IRRITABLE: NOT AT ALL
5. BEING SO RESTLESS THAT IT IS HARD TO SIT STILL: NOT AT ALL
1. FEELING NERVOUS, ANXIOUS, OR ON EDGE: SEVERAL DAYS
4. TROUBLE RELAXING: SEVERAL DAYS
2. NOT BEING ABLE TO STOP OR CONTROL WORRYING: NOT AT ALL
7. FEELING AFRAID AS IF SOMETHING AWFUL MIGHT HAPPEN: NOT AT ALL

## 2021-05-24 ENCOUNTER — RECORDS - HEALTHEAST (OUTPATIENT)
Dept: ADMINISTRATIVE | Facility: CLINIC | Age: 56
End: 2021-05-24

## 2021-05-25 ENCOUNTER — RECORDS - HEALTHEAST (OUTPATIENT)
Dept: ADMINISTRATIVE | Facility: CLINIC | Age: 56
End: 2021-05-25

## 2021-05-26 ENCOUNTER — RECORDS - HEALTHEAST (OUTPATIENT)
Dept: ADMINISTRATIVE | Facility: CLINIC | Age: 56
End: 2021-05-26

## 2021-05-27 NOTE — TELEPHONE ENCOUNTER
ANTICOAGULATION  MANAGEMENT PROGRAM    Maninder BOURNE Shawneffie is overdue for INR check.  Reminder call made.    Left message for Maninder reminding them to have INR checked at upcoming office visit on 4/4/19    Olena Matias RN

## 2021-05-27 NOTE — TELEPHONE ENCOUNTER
ANTICOAGULATION  MANAGEMENT PROGRAM    Maninder Lacy is being discharged from the [facility] Anticoagulation Management Program (AC).    Reason for discharge: warfarin replaced by alternate therapy, Xarelto    ACM referral closed, anticoagulation episode resolved and INR standing order discontinued.     If Maninder needs warfarin management in the future, please send a new referral.    Olena Matias RN

## 2021-05-27 NOTE — TELEPHONE ENCOUNTER
INR today 1.0    Patient is on Xarelto  [facility] called and left a message for the patient to monitor any signs and symptoms of bleeding or signs  and symptoms of a blood clot and call triage line or seek medical attention if noted.    Updated that he will be discharged from the ACM Program and he will not need any further INR checks.    Also instructed to call [facility]  If he has any questions or concerns.    Olena Matias RN

## 2021-05-27 NOTE — TELEPHONE ENCOUNTER
----- Message from DAREK Harris sent at 4/8/2019  6:24 AM CDT -----  Labs ok, could be consistent with mild infection. If symptoms persist I would pursue stress test. If you would like to go ahead with that just let my nurse know and we can place order.

## 2021-05-27 NOTE — PROGRESS NOTES
ASSESSMENT:  1. Fatigue, unspecified type  Glycosylated Hemoglobin A1c    HM1(CBC and Differential)    Thyroid Cascade    Vitamin D, Total (25-Hydroxy)    Comprehensive Metabolic Panel    Vitamin B12    HM1 (CBC with Diff)   2. Pulmonary embolism (H)  INR       PLAN:  Labs today, if normal consider follow up pharmacologic stress test.   No problem-specific Assessment & Plan notes found for this encounter.    The following high BMI interventions were performed this visit: encouragement to exercise and weight monitoring  There are no Patient Instructions on file for this visit.    Orders Placed This Encounter   Procedures     Glycosylated Hemoglobin A1c     Thyroid Cascade     Vitamin D, Total (25-Hydroxy)     Comprehensive Metabolic Panel     Vitamin B12     HM1 (CBC with Diff)     There are no discontinued medications.    No follow-ups on file.    CHIEF COMPLAINT:  Chief Complaint   Patient presents with     Fatigue     c/o not having much energy        HISTORY OF PRESENT ILLNESS:  Maninder is a 54 y.o. male here today with ongoing fatigue times 1-2 weeks.  Patient has complained of this before on and off.  When I saw him for his physical several months back this fall he stated that he was having some ongoing fatigue as well.  Patient has history of persistent asthma, chronic sinusitis, hypertension, hyperlipidemia, obesity, type 2 diabetes.  No recent changes to health status that he is aware of.  Has had a slightly abnormal echocardiogram in the past.  No changes to medications.  Does not feel ill in any way.  His allergist continues to follow with him and he takes prednisone on a chronic basis.  He has increased the dose again where it was previously decreased.  We had thought may be at the time of his physical this was part to do with his fatigue at the time but with increase of dose again this should be improved.  BBs has been under good control no changes there are no changes to the diet so would not suspect that  that would be contributing to fatigue.  We will check lab work today to rule out any metabolic cause and he can also discussed likelihood of a sinus infection or superimposed infection on top of his chronic issues with his allergist who has an appointment with next week.  If this is unlikely would recommend that he follow-up with further cardiovascular evaluation given previously abnormal testing in the past.  REVIEW OF SYSTEMS:        All other systems are negative  PFSH:  Reviewed, no changes      TOBACCO USE:  Social History     Tobacco Use   Smoking Status Passive Smoke Exposure - Never Smoker   Smokeless Tobacco Never Used   Tobacco Comment    As a child, parents smoked.        VITALS:  Vitals:    04/04/19 1300   BP: 136/82   Patient Site: Left Arm   Patient Position: Sitting   Cuff Size: Adult Large   Pulse: 80   Resp: 16   Weight: (!) 305 lb 8 oz (138.6 kg)     Wt Readings from Last 3 Encounters:   04/04/19 (!) 305 lb 8 oz (138.6 kg)   11/27/18 (!) 305 lb 12.8 oz (138.7 kg)   06/27/18 (!) 307 lb (139.3 kg)       PHYSICAL EXAM:   /82 (Patient Site: Left Arm, Patient Position: Sitting, Cuff Size: Adult Large)   Pulse 80   Resp 16   Wt (!) 305 lb 8 oz (138.6 kg)   BMI 41.33 kg/m    General appearance: alert, appears stated age and cooperative  Head: Normocephalic, without obvious abnormality, atraumatic  Eyes: conjunctivae/corneas clear. PERRL, EOM's intact. Fundi benign.  Ears: normal TM's and external ear canals both ears  Nose: Nares normal. Septum midline. Mucosa normal. No drainage or sinus tenderness.  Throat: lips, mucosa, and tongue normal; teeth and gums normal  Neck: no adenopathy, no carotid bruit, no JVD, supple, symmetrical, trachea midline and thyroid not enlarged, symmetric, no tenderness/mass/nodules  Lungs: clear to auscultation bilaterally  Heart: regular rate and rhythm, S1, S2 normal, no murmur, click, rub or gallop  Extremities: extremities normal, atraumatic, no cyanosis or  edema  Pulses: 2+ and symmetric  Neurologic: Grossly normal    DATA REVIEWED:  Additional History from Old Records Summarized (2): 0  Decision to Obtain Records (1): 0  Radiology Tests Summarized or Ordered (1): NM stress test  Labs Reviewed or Ordered (1): 1  Medicine Test Summarized or Ordered (1): 0  Independent Review of EKG or X-RAY(2 each): 0    The visit lasted a total of 40 minutes face to face with the patient. Over 50% of the time was spent counseling and educating the patient about plan of care.    MEDICATIONS:  Current Outpatient Medications   Medication Sig Dispense Refill     albuterol (PROVENTIL) 2.5 mg /3 mL (0.083 %) nebulizer solution 2.5 mg every 4 (four) hours as needed.        blood sugar diagnostic (ONE TOUCH ULTRA TEST) Strp Use As Directed.       fluticasone-salmeterol (ADVAIR DISKUS) 500-50 mcg/dose DISKUS Inhale 1 puff 2 (two) times a day.       lisinopril (PRINIVIL,ZESTRIL) 5 MG tablet Take 1 tablet (5 mg total) by mouth daily. 30 tablet 11     multivitamin capsule Take 1 capsule by mouth daily.       omalizumab (XOLAIR) 150 mg injection Inject 225 mg under the skin every 14 (fourteen) days.       omeprazole (PRILOSEC) 20 MG capsule TAKE ONE CAPSULE BY MOUTH DAILY. 30 capsule 11     ONE TOUCH ULTRASOFT LANCETS MISC Use As Directed.       pioglitazone (ACTOS) 30 MG tablet Take 1 tablet (30 mg total) by mouth daily. 30 tablet 11     predniSONE (DELTASONE) 1 MG tablet Take 3 mg by mouth daily.        rivaroxaban (XARELTO) 10 mg tablet Take 1 tablet (10 mg total) by mouth daily. 90 tablet 1     rosuvastatin (CRESTOR) 20 MG tablet Take 1 tablet (20 mg total) by mouth daily. 30 tablet 11     No current facility-administered medications for this visit.        This note has been dictated using voice recognition software. Any grammatical or context distortions are unintentional and inherent to the software

## 2021-05-27 NOTE — TELEPHONE ENCOUNTER
Pt notified of results. Pt would like to go ahead with the stress test. Pt stated mornings work best for him. Order pended, please review or change.

## 2021-05-28 ASSESSMENT — ASTHMA QUESTIONNAIRES
ACT_TOTALSCORE: 23
ACT_TOTALSCORE: 23

## 2021-05-28 ASSESSMENT — ANXIETY QUESTIONNAIRES
GAD7 TOTAL SCORE: 21
GAD7 TOTAL SCORE: 2

## 2021-05-28 NOTE — TELEPHONE ENCOUNTER
----- Message from DAREK Harris sent at 4/26/2019 12:45 PM CDT -----  Small area of ischemia but otherwise stress test normal. You are at low risk for future cardiac ischemic events/heart attack. If fatigue persists we should consider other causes.

## 2021-05-28 NOTE — TELEPHONE ENCOUNTER
Left message to call back for: pt results  Information to relay to patient:  See message below.

## 2021-05-31 VITALS — WEIGHT: 311.56 LBS | BODY MASS INDEX: 39.98 KG/M2 | HEIGHT: 74 IN

## 2021-05-31 NOTE — TELEPHONE ENCOUNTER
Will refill patient's Xarelto for Dr. Samano in her absence.  He will need to establish care with a new primary doctor but I am not sure why he is on Xarelto.  For treatment of pulmonary embolism it is typically only for 3 months unless he has had recurrent problems.  Not sure if he has been left on the medication longer than necessary. New pcp can address

## 2021-06-01 ENCOUNTER — RECORDS - HEALTHEAST (OUTPATIENT)
Dept: ADMINISTRATIVE | Facility: CLINIC | Age: 56
End: 2021-06-01

## 2021-06-01 VITALS — BODY MASS INDEX: 39.42 KG/M2 | WEIGHT: 307 LBS

## 2021-06-02 ENCOUNTER — RECORDS - HEALTHEAST (OUTPATIENT)
Dept: ADMINISTRATIVE | Facility: CLINIC | Age: 56
End: 2021-06-02

## 2021-06-02 VITALS — HEIGHT: 72 IN | BODY MASS INDEX: 41.42 KG/M2 | WEIGHT: 305.8 LBS

## 2021-06-02 VITALS — BODY MASS INDEX: 41.33 KG/M2 | WEIGHT: 305.5 LBS

## 2021-06-03 NOTE — TELEPHONE ENCOUNTER
RN cannot approve Refill Request: Pioglitazone    RN can NOT refill this medication PCP messaged that patient is overdue for Labs. Last office visit: 4/4/2019 Parrsi Berman FNP Last Physical: 11/27/2018 Last MTM visit: Visit date not found Last visit same specialty: 4/4/2019 Parris Berman FNP.  Next visit within 3 mo: Visit date not found  Next physical within 3 mo: Visit date not found    Refills Approved x 3     Rx renewed per Medication Renewal Policy. Medications were last renewed on 11/27/2018 with 11 refills each.   Last office visit: 4/4/2019 with A Antonella OSWALD @ [facility].    Mirian Novak, [facility] Triage/Med Refill 11/17/2019     Requested Prescriptions   Pending Prescriptions Disp Refills     rosuvastatin (CRESTOR) 20 MG tablet [Pharmacy Med Name: ROSUVASTATIN 20MG TABLETS] 30 tablet 0     Sig: TAKE 1 TABLET(20 MG) BY MOUTH DAILY       Statins Refill Protocol (Hmg CoA Reductase Inhibitors) Passed - 11/17/2019 10:03 AM        Passed - PCP or prescribing provider visit in past 12 months      Last office visit with prescriber/PCP: 4/4/2019 Parris Berman FNP OR same dept: 4/4/2019 Parris Berman FNP OR same specialty: 4/4/2019 Parris Berman FNP  Last physical: 11/27/2018 Last MTM visit: Visit date not found   Next visit within 3 mo: Visit date not found  Next physical within 3 mo: Visit date not found  Prescriber OR PCP: DAREK Harris  Last diagnosis associated with med order: 1. Hyperlipidemia  - rosuvastatin (CRESTOR) 20 MG tablet [Pharmacy Med Name: ROSUVASTATIN 20MG TABLETS]; TAKE 1 TABLET(20 MG) BY MOUTH DAILY  Dispense: 30 tablet; Refill: 0    2. Reflux esophagitis  - omeprazole (PRILOSEC) 20 MG capsule [Pharmacy Med Name: OMEPRAZOLE 20MG CAPSULES]; TAKE 1 CAPSULE BY MOUTH DAILY  Dispense: 30 capsule; Refill: 0    3. Type II diabetes mellitus (H)  - pioglitazone (ACTOS) 30 MG tablet [Pharmacy Med Name: PIOGLITAZONE 30MG TABLETS]; TAKE 1 TABLET(30 MG) BY MOUTH DAILY  Dispense: 30 tablet; Refill: 0    4. Essential  hypertension  - lisinopril (PRINIVIL,ZESTRIL) 5 MG tablet [Pharmacy Med Name: LISINOPRIL 5MG TABLETS]; TAKE 1 TABLET(5 MG) BY MOUTH DAILY  Dispense: 30 tablet; Refill: 0    If protocol passes may refill for 12 months if within 3 months of last provider visit (or a total of 15 months).             omeprazole (PRILOSEC) 20 MG capsule [Pharmacy Med Name: OMEPRAZOLE 20MG CAPSULES] 30 capsule 0     Sig: TAKE 1 CAPSULE BY MOUTH DAILY       GI Medications Refill Protocol Passed - 11/17/2019 10:03 AM        Passed - PCP or prescribing provider visit in last 12 or next 3 months.     Last office visit with prescriber/PCP: 4/4/2019 Parris Berman FNP OR same dept: 4/4/2019 Parris Berman FNP OR same specialty: 4/4/2019 Parris Berman FNP  Last physical: 11/27/2018 Last MTM visit: Visit date not found   Next visit within 3 mo: Visit date not found  Next physical within 3 mo: Visit date not found  Prescriber OR PCP: DAREK Harris  Last diagnosis associated with med order: 1. Hyperlipidemia  - rosuvastatin (CRESTOR) 20 MG tablet [Pharmacy Med Name: ROSUVASTATIN 20MG TABLETS]; TAKE 1 TABLET(20 MG) BY MOUTH DAILY  Dispense: 30 tablet; Refill: 0    2. Reflux esophagitis  - omeprazole (PRILOSEC) 20 MG capsule [Pharmacy Med Name: OMEPRAZOLE 20MG CAPSULES]; TAKE 1 CAPSULE BY MOUTH DAILY  Dispense: 30 capsule; Refill: 0    3. Type II diabetes mellitus (H)  - pioglitazone (ACTOS) 30 MG tablet [Pharmacy Med Name: PIOGLITAZONE 30MG TABLETS]; TAKE 1 TABLET(30 MG) BY MOUTH DAILY  Dispense: 30 tablet; Refill: 0    4. Essential hypertension  - lisinopril (PRINIVIL,ZESTRIL) 5 MG tablet [Pharmacy Med Name: LISINOPRIL 5MG TABLETS]; TAKE 1 TABLET(5 MG) BY MOUTH DAILY  Dispense: 30 tablet; Refill: 0    If protocol passes may refill for 12 months if within 3 months of last provider visit (or a total of 15 months).             pioglitazone (ACTOS) 30 MG tablet [Pharmacy Med Name: PIOGLITAZONE 30MG TABLETS] 30 tablet 0     Sig: TAKE 1 TABLET(30 MG) BY MOUTH DAILY        Pioglitazone Protocol Failed - 11/17/2019 10:03 AM        Failed - Visit with PCP or prescribing provider visit in last 6 months      Last office visit with prescriber/PCP: Visit date not found OR same dept: 4/4/2019 Parris Berman FNP OR same specialty: 4/4/2019 Parris Berman FNP Last physical: Visit date not found Last MTM visit: Visit date not found         Next appt within 3 mo: Visit date not found  Next physical within 3 mo: Visit date not found  Prescriber OR PCP: DAREK Harris  Last diagnosis associated with med order: 1. Hyperlipidemia  - rosuvastatin (CRESTOR) 20 MG tablet [Pharmacy Med Name: ROSUVASTATIN 20MG TABLETS]; TAKE 1 TABLET(20 MG) BY MOUTH DAILY  Dispense: 30 tablet; Refill: 0    2. Reflux esophagitis  - omeprazole (PRILOSEC) 20 MG capsule [Pharmacy Med Name: OMEPRAZOLE 20MG CAPSULES]; TAKE 1 CAPSULE BY MOUTH DAILY  Dispense: 30 capsule; Refill: 0    3. Type II diabetes mellitus (H)  - pioglitazone (ACTOS) 30 MG tablet [Pharmacy Med Name: PIOGLITAZONE 30MG TABLETS]; TAKE 1 TABLET(30 MG) BY MOUTH DAILY  Dispense: 30 tablet; Refill: 0    4. Essential hypertension  - lisinopril (PRINIVIL,ZESTRIL) 5 MG tablet [Pharmacy Med Name: LISINOPRIL 5MG TABLETS]; TAKE 1 TABLET(5 MG) BY MOUTH DAILY  Dispense: 30 tablet; Refill: 0     If protocol passes may refill for 12 months if within 3 months of last provider visit (or a total of 15 months).           Failed - A1C in last 6 months     Hemoglobin A1c   Date Value Ref Range Status   04/04/2019 6.6 (H) 3.5 - 6.0 % Final               Passed - Blood pressure in last 12 months     BP Readings from Last 1 Encounters:   04/04/19 136/82             Passed - LFT or AST or ALT in last 12 months     Albumin   Date Value Ref Range Status   04/04/2019 4.2 3.5 - 5.0 g/dL Final     Bilirubin, Total   Date Value Ref Range Status   04/04/2019 0.6 0.0 - 1.0 mg/dL Final     Bilirubin, Direct   Date Value Ref Range Status   08/21/2010 0.4 <0.6 mg/dL Final     Alkaline  Phosphatase   Date Value Ref Range Status   04/04/2019 88 45 - 120 U/L Final     AST   Date Value Ref Range Status   04/04/2019 17 0 - 40 U/L Final     ALT   Date Value Ref Range Status   04/04/2019 20 0 - 45 U/L Final     Protein, Total   Date Value Ref Range Status   04/04/2019 7.0 6.0 - 8.0 g/dL Final                Passed - Microalbumin in last year     Microalbumin, Random Urine   Date Value Ref Range Status   11/27/2018 1.41 0.00 - 1.99 mg/dL Final                  Passed - Serum creatinine in last year     Creatinine   Date Value Ref Range Status   04/04/2019 0.86 0.70 - 1.30 mg/dL Final             lisinopril (PRINIVIL,ZESTRIL) 5 MG tablet [Pharmacy Med Name: LISINOPRIL 5MG TABLETS] 30 tablet 0     Sig: TAKE 1 TABLET(5 MG) BY MOUTH DAILY       Ace Inhibitors Refill Protocol Passed - 11/17/2019 10:03 AM        Passed - PCP or prescribing provider visit in past 12 months       Last office visit with prescriber/PCP: 4/4/2019 Parris Berman FNP OR same dept: 4/4/2019 Parris Berman FNP OR same specialty: 4/4/2019 Parris Berman FNP  Last physical: 11/27/2018 Last MTM visit: Visit date not found   Next visit within 3 mo: Visit date not found  Next physical within 3 mo: Visit date not found  Prescriber OR PCP: DAREK Harris  Last diagnosis associated with med order: 1. Hyperlipidemia  - rosuvastatin (CRESTOR) 20 MG tablet [Pharmacy Med Name: ROSUVASTATIN 20MG TABLETS]; TAKE 1 TABLET(20 MG) BY MOUTH DAILY  Dispense: 30 tablet; Refill: 0    2. Reflux esophagitis  - omeprazole (PRILOSEC) 20 MG capsule [Pharmacy Med Name: OMEPRAZOLE 20MG CAPSULES]; TAKE 1 CAPSULE BY MOUTH DAILY  Dispense: 30 capsule; Refill: 0    3. Type II diabetes mellitus (H)  - pioglitazone (ACTOS) 30 MG tablet [Pharmacy Med Name: PIOGLITAZONE 30MG TABLETS]; TAKE 1 TABLET(30 MG) BY MOUTH DAILY  Dispense: 30 tablet; Refill: 0    4. Essential hypertension  - lisinopril (PRINIVIL,ZESTRIL) 5 MG tablet [Pharmacy Med Name: LISINOPRIL 5MG TABLETS]; TAKE 1 TABLET(5  MG) BY MOUTH DAILY  Dispense: 30 tablet; Refill: 0    If protocol passes may refill for 12 months if within 3 months of last provider visit (or a total of 15 months).             Passed - Serum Potassium in past 12 months     Lab Results   Component Value Date    Potassium 4.7 04/04/2019             Passed - Blood pressure filed in past 12 months     BP Readings from Last 1 Encounters:   04/04/19 136/82             Passed - Serum Creatinine in past 12 months     Creatinine   Date Value Ref Range Status   04/04/2019 0.86 0.70 - 1.30 mg/dL Final

## 2021-06-03 NOTE — TELEPHONE ENCOUNTER
RN cannot approve Refill Request    RN can NOT refill this medication med is not covered by policy/route to provider. Last office visit: Visit date not found Last Physical: Visit date not found Last MTM visit: Visit date not found Last visit same specialty: 4/4/2019 Parris Berman FNP.  Next visit within 3 mo: Visit date not found  Next physical within 3 mo: Visit date not found      Mirian Novak, [facility] Triage/Med Refill 11/17/2019    Requested Prescriptions   Pending Prescriptions Disp Refills     XARELTO 10 mg tablet [Pharmacy Med Name: XARELTO 10MG TABLETS] 90 tablet 0     Sig: TAKE 1 TABLET(10 MG) BY MOUTH DAILY       Apixaban/Rivaroxaban/Dabigatran Refill Protocol Failed - 11/17/2019 10:01 AM        Failed - Route to appropriate pool/provider     Last Anticoagulation Summary:           Passed - Renal function test in last year     Creatinine   Date Value Ref Range Status   04/04/2019 0.86 0.70 - 1.30 mg/dL Final             Passed - PCP or prescribing provider visit in past 12 months       Last office visit with prescriber/PCP: Visit date not found OR same dept: 4/4/2019 Parris Berman FNP OR same specialty: 4/4/2019 Parris Berman FNP  Last physical: Visit date not found Last MTM visit: Visit date not found   Next visit within 3 mo: Visit date not found  Next physical within 3 mo: Visit date not found  Prescriber OR PCP: Vashti Smith DO  Last diagnosis associated with med order: 1. Other pulmonary embolism without acute cor pulmonale, unspecified chronicity (H)  - XARELTO 10 mg tablet [Pharmacy Med Name: XARELTO 10MG TABLETS]; TAKE 1 TABLET(10 MG) BY MOUTH DAILY  Dispense: 90 tablet; Refill: 0    If protocol passes may refill for 12 months if within 3 months of last provider visit (or a total of 15 months).

## 2021-06-03 NOTE — PATIENT INSTRUCTIONS - HE
Fasting labs    Update Td     Cologuard is preferred by the pt    The following high BMI interventions were performed this visit: encouragement to exercise and dietary management education, guidance, and counseling

## 2021-06-03 NOTE — PROGRESS NOTES
Assessment:      Healthy male exam.    Encounter Diagnoses   Name Primary?     Persistent asthma without complication, unspecified asthma severity                         The diagnosis was confirmed.  The condition is stable/controlled on MEDS PER ASTHMA SPECIALIST and no side effects  have been noted.  The appropriate follow up labs  ( UTD )have been ordered  (OR ARE UP TO DATE) and medication refills ordered if requested.    ACT:  24       Yes     Essential hypertension                         The diagnosis was confirmed.  The condition is stable/controlled on LISINOPRIL and no side effects  have been noted.  The appropriate follow up labs  ( CMP )have been ordered  (OR ARE UP TO DATE) and medication refills ordered if requested.        Type 2 diabetes mellitus without complication, without long-term current use of insulin (H)                         The diagnosis was confirmed.  The condition is stable/controlled on PIOGLITAZONE and no side effects  have been noted.  The appropriate follow up labs  ( A1C )have been ordered  (OR ARE UP TO DATE) and medication refills ordered if requested.        Chronic Reflux Esophagitis                         The diagnosis was confirmed.  The condition is stable/controlled on OMEPRAZOLE and no side effects  have been noted.  The appropriate follow up labs  ( NA )have been ordered  (OR ARE UP TO DATE) and medication refills ordered if requested.        Pulmonary embolism without acute cor pulmonale, unspecified chronicity, unspecified pulmonary embolism type (H)                         The diagnosis was confirmed.  The condition is stable/controlled on XARELTO and no side effects  have been noted.  The appropriate follow up labs  ( NA)have been ordered  (OR ARE UP TO DATE) and medication refills ordered if requested.        Screening PSA (prostate specific antigen)      Encounter for monitoring statin therapy                         The diagnosis was confirmed.  The condition is  stable/controlled on ROSUVASTATIN and no side effects  have been noted.  The appropriate follow up labs  ( FLP,CMP )have been ordered  (OR ARE UP TO DATE) and medication refills ordered if requested.        Special screening for malignant neoplasms, colon      Health care maintenance      Class 3 severe obesity due to excess calories without serious comorbidity with body mass index (BMI) of 40.0 to 44.9 in adult (H)        Plan:       All questions answered.       Fasting labs    Update Td     Cologuard is preferred by the pt    The following high BMI interventions were performed this visit: encouragement to exercise and dietary management education, guidance, and counseling             Subjective:      Maninder Lacy is a 54 y.o. male who presents for an annual exam. The patient reports that there is not domestic violence in his life.   Eye check done in Jan 2019  Healthy Habits:   Regular Exercise: Yes and walking at work.  yardwork.  Sunscreen Use: Yes and intermittent  Healthy Diet: Yes  Dental Visits Regularly: Yes  Seat Belt: Yes  Sexually active: No  Monthly Self Testicular Exams:  Yes  Hemoccults: N/A  Flex Sig: N/A  Colonoscopy: pt prefers to do a Cologuard due to the downtime required for colonoscopy  Lipid Profile: Yes  Glucose Screen: Yes  Prevention of Osteoporosis: Yes  Last Dexa: N/A  Guns at Home:  Yes  Guns Safety Locks:  Yes      Immunization History   Administered Date(s) Administered     DT (pediatric) 04/01/1997     Influenza, inj, historic,unspecified 10/09/2007, 10/14/2008, 09/18/2017, 10/01/2019     Influenza, seasonal,quad inj 6-35 mos 09/14/2009, 10/18/2010, 10/04/2011, 09/26/2012     Pneumo Polysac 23-V 11/05/2004, 09/26/2012     Tdap 11/17/2008     Immunization status: update the Td.    No exam data present    Current Outpatient Medications   Medication Sig Dispense Refill     blood sugar diagnostic (ONE TOUCH ULTRA TEST) Strp Use As Directed.       FASENRA 30 mg/mL Syrg injection Inject  30 mg under the skin every 30 (thirty) days.       fluticasone propion-salmeterol (ADVAIR) 500-50 mcg/dose DISKUS Inhale 1 puff 2 (two) times a day. Wixela       lisinopril (PRINIVIL,ZESTRIL) 5 MG tablet Take 1 tablet (5 mg total) by mouth daily. 90 tablet 0     multivitamin capsule Take 1 capsule by mouth daily.       omeprazole (PRILOSEC) 20 MG capsule Take 1 capsule (20 mg total) by mouth daily. 90 capsule 0     ONE TOUCH ULTRASOFT LANCETS MISC Use As Directed.       pioglitazone (ACTOS) 30 MG tablet TAKE 1 TABLET(30 MG) BY MOUTH DAILY 90 tablet 0     predniSONE (DELTASONE) 1 MG tablet Take 5 mg by mouth daily.       rosuvastatin (CRESTOR) 20 MG tablet Take 1 tablet (20 mg total) by mouth daily. 90 tablet 0     XARELTO 10 mg tablet TAKE 1 TABLET(10 MG) BY MOUTH DAILY 90 tablet 0     No current facility-administered medications for this visit.      Past Medical History:   Diagnosis Date     Breast injury 04/2016     Pulmonary edema      Past Surgical History:   Procedure Laterality Date     AK KNEE SCOPE,DIAGNOSTIC      Description: Arthroscopy Knee Right;  Recorded: 08/06/2010;  Comments: august, 2010 for medial meniscal tear     Aspirin; Cephalexin; and Theophylline  Family History   Problem Relation Age of Onset     Cancer Father         stomach and bladder     Breast cancer Paternal Aunt      Social History     Socioeconomic History     Marital status:      Spouse name: Not on file     Number of children: Not on file     Years of education: Not on file     Highest education level: Not on file   Occupational History     Not on file   Social Needs     Financial resource strain: Not on file     Food insecurity:     Worry: Not on file     Inability: Not on file     Transportation needs:     Medical: Not on file     Non-medical: Not on file   Tobacco Use     Smoking status: Passive Smoke Exposure - Never Smoker     Smokeless tobacco: Never Used     Tobacco comment: As a child, parents smoked.    Substance  "and Sexual Activity     Alcohol use: Not on file     Drug use: Not on file     Sexual activity: Not on file   Lifestyle     Physical activity:     Days per week: Not on file     Minutes per session: Not on file     Stress: Not on file   Relationships     Social connections:     Talks on phone: Not on file     Gets together: Not on file     Attends Confucianist service: Not on file     Active member of club or organization: Not on file     Attends meetings of clubs or organizations: Not on file     Relationship status: Not on file     Intimate partner violence:     Fear of current or ex partner: Not on file     Emotionally abused: Not on file     Physically abused: Not on file     Forced sexual activity: Not on file   Other Topics Concern     Not on file   Social History Narrative     Not on file       Review of Systems  General:  Denies problem  Eyes: Denies problem  Ears/Nose/Throat: Denies problem  Cardiovascular: Denies problem  Respiratory:  Denies problem  Gastrointestinal:  Denies problem  Genitourinary: Denies problem  Musculoskeletal:  Denies problem  Skin: Denies problem  Neurologic: Denies problem  Psychiatric: Denies problem  Endocrine: Denies problem  Heme/Lymphatic: Denies problem   Allergic/Immunologic: Denies problem        Objective:     Vitals:    12/02/19 0929   BP: 128/79   Pulse: 83   Resp: 20   Temp: 97.1  F (36.2  C)   TempSrc: Oral   SpO2: 95%   Weight: (!) 309 lb 12.8 oz (140.5 kg)   Height: 6' 0.75\" (1.848 m)     Wt Readings from Last 3 Encounters:   12/02/19 (!) 309 lb 12.8 oz (140.5 kg)   04/04/19 (!) 305 lb 8 oz (138.6 kg)   11/27/18 (!) 305 lb 12.8 oz (138.7 kg)      Body mass index is 41.15 kg/m .    Physical  General Appearance: Alert, cooperative, no distress, appears stated age  Head: Normocephalic, without obvious abnormality, atraumatic  Eyes: PERRL, conjunctiva/corneas clear, EOM's intact  Ears: Normal TM's and external ear canals, both ears  Nose: Nares normal, septum midline,mucosa " normal, no drainage  Throat: Lips, mucosa, and tongue normal; teeth and gums normal  Neck: Supple, symmetrical, trachea midline, no adenopathy;  thyroid: not enlarged, symmetric, no tenderness/mass/nodules; no carotid bruit or JVD  Back: Symmetric, no curvature, ROM normal, no CVA tenderness  Lungs: Clear to auscultation bilaterally, respirations unlabored  Heart: Regular rate and rhythm, S1 and S2 normal, no murmur, rub, or gallop,  Abdomen: Soft, non-tender, bowel sounds active all four quadrants,  no masses, no organomegaly  Genitourinary: Penis normal. Right testis is descended. Left testis is descended.   PROSTATE SMOOTH SYMMETRIC WITHOUT NODULARITY, TX, OR FIRMNESS  Musculoskeletal: Normal range of motion. No joint swelling or deformity.   Extremities: Extremities normal, atraumatic, no cyanosis or edema  Skin: Skin color, texture, turgor normal, no rashes or lesions  Lymph nodes: Cervical, supraclavicular, and axillary nodes normal  Neurologic: He is alert. He has normal reflexes.   Psychiatric: He has a normal mood and affect.     FEET:  MF TESTING: NL              SKIN EXAM:  NL              VASCULAR:   R DP  PULSE: +                                      L DP  PULSE: +                                      R PT  PULSE: +                                      L PT  PULSE:  +        Lab Results   Component Value Date    PSA 0.4 11/27/2018    PSA 0.5 11/03/2017    PSA 0.4 09/26/2012     Lab Results   Component Value Date    HGBA1C 6.6 (H) 04/04/2019     Lab Results   Component Value Date    CHOL 144 11/27/2018    CHOL 156 11/03/2017    CHOL 144 10/24/2016     Lab Results   Component Value Date    HDL 55 11/27/2018    HDL 53 11/03/2017    HDL 58 10/24/2016     Lab Results   Component Value Date    LDLCALC 62 11/27/2018    LDLCALC 79 11/03/2017    LDLCALC 60 10/24/2016     Lab Results   Component Value Date    TRIG 134 11/27/2018    TRIG 120 11/03/2017    TRIG 130 10/24/2016     Results for orders placed or  performed in visit on 04/04/19   Comprehensive Metabolic Panel   Result Value Ref Range    Sodium 137 136 - 145 mmol/L    Potassium 4.7 3.5 - 5.0 mmol/L    Chloride 103 98 - 107 mmol/L    CO2 22 22 - 31 mmol/L    Anion Gap, Calculation 12 5 - 18 mmol/L    Glucose 202 (H) 70 - 125 mg/dL    BUN 16 8 - 22 mg/dL    Creatinine 0.86 0.70 - 1.30 mg/dL    GFR MDRD Af Amer >60 >60 mL/min/1.73m2    GFR MDRD Non Af Amer >60 >60 mL/min/1.73m2    Bilirubin, Total 0.6 0.0 - 1.0 mg/dL    Calcium 10.0 8.5 - 10.5 mg/dL    Protein, Total 7.0 6.0 - 8.0 g/dL    Albumin 4.2 3.5 - 5.0 g/dL    Alkaline Phosphatase 88 45 - 120 U/L    AST 17 0 - 40 U/L    ALT 20 0 - 45 U/L       No components found for: CHOLHDL

## 2021-06-04 VITALS
BODY MASS INDEX: 41.06 KG/M2 | SYSTOLIC BLOOD PRESSURE: 128 MMHG | WEIGHT: 309.8 LBS | DIASTOLIC BLOOD PRESSURE: 79 MMHG | OXYGEN SATURATION: 95 % | TEMPERATURE: 97.1 F | HEART RATE: 83 BPM | RESPIRATION RATE: 20 BRPM | HEIGHT: 73 IN

## 2021-06-04 NOTE — TELEPHONE ENCOUNTER
----- Message from Isma Stringer MD sent at 12/26/2019  9:41 PM CST -----  PLEASE UPDATE THE HEALTH MAINTENANCE LIST WITH THIS PT'S COLOGUARD, THANKS.  Dear Mr. Lacy,    Your recent cologuard  Testing was NEGATIVE.  It should be repeated in 3 years.

## 2021-06-05 VITALS
RESPIRATION RATE: 22 BRPM | DIASTOLIC BLOOD PRESSURE: 85 MMHG | BODY MASS INDEX: 40.58 KG/M2 | WEIGHT: 307.6 LBS | HEART RATE: 86 BPM | SYSTOLIC BLOOD PRESSURE: 136 MMHG | TEMPERATURE: 97.4 F

## 2021-06-05 VITALS
RESPIRATION RATE: 24 BRPM | TEMPERATURE: 96.8 F | BODY MASS INDEX: 41.17 KG/M2 | HEART RATE: 82 BPM | SYSTOLIC BLOOD PRESSURE: 133 MMHG | HEIGHT: 73 IN | WEIGHT: 310.6 LBS | DIASTOLIC BLOOD PRESSURE: 80 MMHG

## 2021-06-05 VITALS
HEART RATE: 92 BPM | RESPIRATION RATE: 24 BRPM | DIASTOLIC BLOOD PRESSURE: 86 MMHG | TEMPERATURE: 98 F | WEIGHT: 307.6 LBS | BODY MASS INDEX: 40.58 KG/M2 | SYSTOLIC BLOOD PRESSURE: 150 MMHG

## 2021-06-06 NOTE — TELEPHONE ENCOUNTER
Refills below were never sent to pharmacy.  Please send to [facility].    Requested Prescriptions     Pending Prescriptions Disp Refills     pioglitazone (ACTOS) 30 MG tablet 90 tablet 3     Sig: TAKE 1 TABLET(30 MG) BY MOUTH DAILY     rivaroxaban ANTICOAGULANT (XARELTO) 10 mg tablet 90 tablet 3     Sig: Take 1 tablet (10 mg total) by mouth daily.     Refused Prescriptions Disp Refills     XARELTO 10 mg tablet [Pharmacy Med Name: XARELTO 10MG TABLETS] 90 tablet 0     Sig: TAKE 1 TABLET(10 MG) BY MOUTH DAILY     Refused By: KASHIF NELSON     Reason for Refusal: Request already responded to by other means (e.g. phone or fax)     pioglitazone (ACTOS) 30 MG tablet [Pharmacy Med Name: PIOGLITAZONE 30MG TABLETS] 90 tablet 0     Sig: TAKE 1 TABLET(30 MG) BY MOUTH DAILY     Refused By: KASHIF NELSON     Reason for Refusal: Request already responded to by other means (e.g. phone or fax)

## 2021-06-09 NOTE — PROGRESS NOTES
Diagnosis: Achilles tendinitis with posterior calcaneal spurring, diabetes    Plan: He was given a new order for custom orthotics through the [facility] podiatric lab.  If pain persists, consider physical therapy.  Diabetic foot exam is otherwise benign today.  Fifteen minute visit, greater than half our time spent counseling and coordinating medical care.     Subjective: The patient returns to the [place] clinic for follow up of diabetes and heel pain. He has used custom orthotics for the past three years with good results. He is ready to replace those.  Posterior calcaneal spurs can create pain by the end of the day.  He is on his feet a lot at work.  Previous plantar fasciitis symptoms have been pretty good.  He denies any numbness or tingling in the feet.    Physical Exam:  General: Pleasant 51 y.o. male in no acute distress.  Vascular: DP pulses are palpable. PT pulses are palpable. Pedal hair is diminished. Feet are warm to the touch.  Neuro: Sensation in the feet is intact per monofilament.  Derm:  No open lesions.  Musculoskeletal: Bony prominences posteriorly at both heels. No plantar pain with palpation.

## 2021-06-13 NOTE — PROGRESS NOTES
MALE PREVENTATIVE EXAM    Assessment and Plan:       1. Type 2 diabetes mellitus without complication, without long-term current use of insulin (H)  Inadequately controlled,  Recheck A1C in Feb 2021 and if not at goal the step on therapy.    2. Morbid obesity (H)  Weight loss clinic offered and declined    3. Hyperlipidemia LDL goal <130  Stable,  Tolerating statin and at goal    4. Essential hypertension  Well controlled  - lisinopriL (PRINIVIL,ZESTRIL) 5 MG tablet; Take 1 tablet (5 mg total) by mouth daily.  Dispense: 90 tablet; Refill: 3      5. Persistent asthma without complication, unspecified asthma severity  Controlled, followed by Asthma specialist    6. Reflux esophagitis  Stable on PPI  - omeprazole (PRILOSEC) 20 MG capsule; Take 1 capsule (20 mg total) by mouth daily before breakfast.  Dispense: 90 capsule; Refill: 3    7. Other pulmonary embolism without acute cor pulmonale, unspecified chronicity (H)  Stable on long term Xarelto  - rivaroxaban ANTICOAGULANT (XARELTO) 10 mg tablet; Take 1 tablet (10 mg total) by mouth daily.  Dispense: 90 tablet; Refill: 3    8. Hyperlipidemia    - rosuvastatin (CRESTOR) 20 MG tablet; Take 1 tablet (20 mg total) by mouth daily.  Dispense: 90 tablet; Refill: 3    9. Type II diabetes mellitus (H)    - pioglitazone (ACTOS) 30 MG tablet; TAKE 1 TABLET(30 MG) BY MOUTH DAILY  Dispense: 90 tablet; Refill: 3    PLAN:   Cologuard due Dec 2022    Check with insurance on Shingrix coverage    Med refills    Recheck A1C  after  2-11-21.  If it has not improved then consider adding glipizide of using one of the newer injectable meds.       Next follow up:  No follow-ups on file.    Immunization Review  Adult Imm Review: No immunizations due today    I discussed the following with the patient:   Adult Healthy Living: Importance of regular exercise  Healthy nutrition    I have had an Advance Directives discussion with the patient.    Subjective:   Chief Complaint: Maninder Lacy is an  "55 y.o. male here for a preventative health visit.     HPI:  na    Healthy Habits  Are you taking a daily aspirin? No  Do you typically exercising at least 40 min, 3-4 times per week?  Yes  Do you usually eat at least 4 servings of fruit and vegetables a day, include whole grains and fiber and avoid regularly eating high fat foods? NO  Have you had an eye exam in the past two years? Yes  Do you see a dentist twice per year? Yes  Do you have any concerns regarding sleep? No    Safety Screen  If you own firearms, are they secured in a locked gun cabinet or with trigger locks? Yes  Do you feel you are safe where you are living?: Yes (12/3/2020  9:56 AM)  Do you feel you are safe in your relationship(s)?: Yes (12/3/2020  9:56 AM)      Review of Systems:  Please see above.  The rest of the review of systems are negative for all systems.     Cancer Screening     Patient has no health maintenance due at this time            History     Reviewed By Date/Time Sections Reviewed    Josi Linn LPN 12/3/2020  9:58 AM Tobacco    Josi Linn LPN 12/3/2020  9:56 AM Tobacco            Objective:   Vital Signs:   Visit Vitals  /80 (Patient Site: Left Arm, Patient Position: Sitting, Cuff Size: Adult Large)   Pulse 82   Temp 96.8  F (36  C) (Oral)   Resp 24   Ht 6' 1\" (1.854 m)   Wt (!) 310 lb 9.6 oz (140.9 kg)   BMI 40.98 kg/m       Wt Readings from Last 3 Encounters:   12/03/20 (!) 310 lb 9.6 oz (140.9 kg)   12/02/19 (!) 309 lb 12.8 oz (140.5 kg)   04/04/19 (!) 305 lb 8 oz (138.6 kg)        PHYSICAL EXAM  /80 (Patient Site: Left Arm, Patient Position: Sitting, Cuff Size: Adult Large)   Pulse 82   Temp 96.8  F (36  C) (Oral)   Resp 24   Ht 6' 1\" (1.854 m)   Wt (!) 310 lb 9.6 oz (140.9 kg)   BMI 40.98 kg/m      General Appearance:    Alert, cooperative, no distress, appears stated age   Head:    Normocephalic, without obvious abnormality, atraumatic   Eyes:    PERRL, conjunctiva/corneas clear, EOM's " intact, fundi     benign, both eyes        Ears:    Normal TM's and external ear canals, both ears   Nose:   Nares normal, septum midline, mucosa normal, no drainage    or sinus tenderness   Throat:   Lips, mucosa, and tongue normal; teeth and gums normal   Neck:   Supple, symmetrical, trachea midline, no adenopathy;        thyroid:  No enlargement/tenderness/nodules; no carotid    bruit or JVD   Back:     Symmetric, no curvature, ROM normal, no CVA tenderness   Lungs:     Clear to auscultation bilaterally, respirations unlabored   Chest wall:    No tenderness or deformity   Heart:    Regular rate and rhythm, S1 and S2 normal, no murmur, rub    or gallop   Abdomen:     Soft, non-tender, bowel sounds active all four quadrants,     no masses, no organomegaly   Genitalia:     Not examined   Rectal:    Exam declined after discussion.   PSAs low and stable      Extremities:   Extremities normal, atraumatic, no cyanosis or edema       Skin:   Skin color, texture, turgor normal, no rashes or lesions   Lymph nodes:   Cervical, supraclavicular, and axillary nodes normal      FEET:  MF TESTING: NL              SKIN EXAM:  NL              VASCULAR:   R DP  PULSE: +                                      L DP  PULSE: +                                      R PT  PULSE: +                                      L PT  PULSE: +         Lab Results   Component Value Date    HGBA1C 7.9 (H) 11/11/2020     Lab Results   Component Value Date    MICROALBUR 12.64 (H) 11/11/2020     Lab Results   Component Value Date    PSA 0.4 11/11/2020    PSA 0.5 12/02/2019    PSA 0.4 11/27/2018     Vitamin D, Total (25-Hydroxy)   Date Value Ref Range Status   11/11/2020 33.3 30.0 - 80.0 ng/mL Final      Results for orders placed or performed in visit on 11/11/20   Comprehensive Metabolic Panel   Result Value Ref Range    Sodium 140 136 - 145 mmol/L    Potassium 4.2 3.5 - 5.0 mmol/L    Chloride 104 98 - 107 mmol/L    CO2 27 22 - 31 mmol/L    Anion Gap,  Calculation 9 5 - 18 mmol/L    Glucose 151 (H) 70 - 125 mg/dL    BUN 16 8 - 22 mg/dL    Creatinine 0.86 0.70 - 1.30 mg/dL    GFR MDRD Af Amer >60 >60 mL/min/1.73m2    GFR MDRD Non Af Amer >60 >60 mL/min/1.73m2    Bilirubin, Total 0.8 0.0 - 1.0 mg/dL    Calcium 9.4 8.5 - 10.5 mg/dL    Protein, Total 6.4 6.0 - 8.0 g/dL    Albumin 4.1 3.5 - 5.0 g/dL    Alkaline Phosphatase 83 45 - 120 U/L    AST 14 0 - 40 U/L    ALT 19 0 - 45 U/L     Lab Results   Component Value Date    CHOL 138 11/11/2020    CHOL 139 12/02/2019    CHOL 144 11/27/2018     Lab Results   Component Value Date    HDL 62 11/11/2020    HDL 63 12/02/2019    HDL 55 11/27/2018     Lab Results   Component Value Date    LDLCALC 59 11/11/2020    LDLCALC 57 12/02/2019    LDLCALC 62 11/27/2018     Lab Results   Component Value Date    TRIG 87 11/11/2020    TRIG 94 12/02/2019    TRIG 134 11/27/2018     No components found for: CHOLHDL              Additional Screenings Completed Today:

## 2021-06-14 NOTE — TELEPHONE ENCOUNTER
Reason for Call:  Other prescription     Detailed comments:   pt is having anxiety & feels stressed out due to covid & wife having cancer.      If sending in a RX, send to the [facility] in [place] on [address]    Please advise    Phone Number Patient can be reached at: Home number on file [phone] (home)    Best Time: anytime    Can we leave a detailed message on this number?: Yes    Call taken on 1/5/2021 at 2:27 PM by Catarina Elliott

## 2021-06-14 NOTE — PROGRESS NOTES
MALE ADULT PREVENTIVE EXAM    CHIEF COMPLAINT:  Male preventive exam.    HISTORY OF PRESENT ILLNESS:  Maninder Lacy is a 52 y.o. male.  He last saw me Visit date not found.  Patient is here today, no specific concerns or questions, he is here to update medications and needs refills today.  We will collect fasting lab work today.  Patient is due to update colonoscopy.  Given information on colonoscopy as well as Gila guard and would like to look into insurance regarding these options.    He  has a past medical history of Breast injury (04/2016) and Pulmonary edema.    Lab Results   Component Value Date    WBC 14.2 (H) 08/22/2010    HGB 13.9 (L) 08/22/2010    HCT 40.0 08/22/2010    MCV 86 08/22/2010     08/22/2010     11/03/2017    K 4.4 11/03/2017    BUN 14 11/03/2017     Lab Results   Component Value Date    CHOL 156 11/03/2017    HDL 53 11/03/2017    LDLCALC 79 11/03/2017    TRIG 120 11/03/2017     Lab Results   Component Value Date    PSA 0.5 11/03/2017     Lab Results   Component Value Date    TSH 0.8 09/26/2012     BP Readings from Last 3 Encounters:   11/03/17 148/80   11/16/16 134/76   06/23/16 142/82       Surgeries:    Past Surgical History:   Procedure Laterality Date     KS KNEE SCOPE,DIAGNOSTIC      Description: Arthroscopy Knee Right;  Recorded: 08/06/2010;  Comments: august, 2010 for medial meniscal tear       Family History:  His family history includes Breast cancer in his paternal aunt; Cancer in his father.    Social History:  He  reports that he is a non-smoker but has been exposed to tobacco smoke. He does not have any smokeless tobacco history on file.    Medications:    Current Outpatient Prescriptions:      albuterol (PROVENTIL) 2.5 mg /3 mL (0.083 %) nebulizer solution, 2.5 mg every 4 (four) hours as needed. , Disp: , Rfl:      blood sugar diagnostic (ONE TOUCH ULTRA TEST) Strp, Use As Directed., Disp: , Rfl:      fluticasone-salmeterol (ADVAIR DISKUS) 500-50 mcg/dose DISKUS,  Inhale 1 puff 2 (two) times a day., Disp: , Rfl:      lisinopril (PRINIVIL,ZESTRIL) 5 MG tablet, TAKE ONE TABLET BY MOUTH DAILY, Disp: 30 tablet, Rfl: 11     multivitamin capsule, Take 1 capsule by mouth daily., Disp: , Rfl:      omalizumab (XOLAIR) 150 mg injection, Inject 225 mg under the skin every 14 (fourteen) days., Disp: , Rfl:      omeprazole (PRILOSEC) 20 MG capsule, TAKE ONE CAPSULE BY MOUTH DAILY, Disp: 90 capsule, Rfl: 3     omeprazole (PRILOSEC) 20 MG capsule, TAKE ONE CAPSULE BY MOUTH DAILY, Disp: 30 capsule, Rfl: 11     ONE TOUCH ULTRASOFT LANCETS MISC, Use As Directed., Disp: , Rfl:      pioglitazone (ACTOS) 30 MG tablet, TAKE ONE TABLET BY MOUTH DAILY, Disp: 30 tablet, Rfl: 11     predniSONE (DELTASONE) 1 MG tablet, Take 7 mg by mouth daily. , Disp: , Rfl:      predniSONE (DELTASONE) 5 MG tablet, Take 5-20 mg by mouth daily. Use 7 mg daily, but also will do a burst of 30 mg as needed., Disp: , Rfl:      rosuvastatin (CRESTOR) 20 MG tablet, TAKE ONE TABLET BY MOUTH DAILY, Disp: 30 tablet, Rfl: 11     warfarin (COUMADIN) 1 MG tablet, Take 1 mg  tablet with 5 MG tablet ( 6 mg ) by mouth daily or as last directed., Disp: 30 tablet, Rfl: 11     warfarin (COUMADIN) 5 MG tablet, TAKE 1 TABLET BY MOUTH DAILY WITH ONE 1MG TABLET=6MG OR AS DIRECTED, Disp: 90 tablet, Rfl: 0     warfarin (COUMADIN) 5 MG tablet, TAKE AS DIRECTED, Disp: 30 tablet, Rfl: 11  HELD MEDICATIONS: None.    Allergies:  No latex allergies.  Allergies   Allergen Reactions     Cephalexin      Theophylline        RISK BEHAVIORS AND HEALTH HABITS:  PSA: The natural history of prostate cancer and ongoing controversy regarding screening and potential treatment outcomes of prostate cancer has been discussed with the patient. The meaning of a false positive PSA and a false negative PSA has been discussed. He indicates understanding of the limitations of this screening test.  Seat Belt Use: YES  Calcium intake/Osteoporosis prevention: YES  Guns:  NO  Sun Screen: YES  Dental Care: YES    REVIEW OF SYSTEMS:  Complete head to toe review of systems is otherwise negative except as above.    OBJECTIVE:  VITAL SIGNS:    Vitals:    11/03/17 0855   BP: 148/80   Pulse: 78   Resp: 16   Temp: 98.7  F (37.1  C)     GENERAL:  Patient alert, in NAD  EYES: PERRLA. Extraoccular movements intact, pupils equal, reactive to light and accommodation.  Normal conjunctiva and lids.  Undilated fudoscopic exam normal, including normal size, appearance cup-to-disc ratio.  Normal posterior segments, including no exudates or hemorrhages.  ENT:  Hearing grossly normal.  Normal appearance to ears and nose.  Bilateral TM s, external canals, oropharynx normal. Normal lips, gums and teeth.  Normal nasal mucosa, septum and turbinates.  NECK:  Supple, without thyromegaly or mass.  RESP:  Clear to auscultation without crackles, wheezes or distress.  Normal respiratory effort.   BREASTS:  Nontender, without masses, nipple discharge, erythema, or axillary adenopathy.  CV:  Regular rate and rhythm without murmurs, rubs or gallops.  Normal carotid, abdominal aorta, femoral and pedal pulses.  No varicosities or edema.  ABDOMEN:  Soft, non-tender, without hepatosplenomegaly, masses, or hernias.  :  Normal scrotum.  Penis circumcised without lesions or discharge.  LYMPHATIC: Normal palpation of neck, groin and axilla..  No lymphadenopathy.  No bruising.  NEURO:  CN II-XII intact, motor & sensory function all intact.  DTR and reflexes normal.  PSYCHIATRIC:  Alert & oriented with normal mood and affect.  Good judgment and insight.  SKIN:  Normal inspection and palpation.  MUSCULOSKELETAL: Normal gait and station.  - Spine / Ribs / Pelvis: Normal inspection, ROM, stability and strength: Spine, Head, Neck, Upper and Lower Extremities.    ASSESSMENT & PLAN  Maninder was seen today for annual exam.    Diagnoses and all orders for this visit:    Health care maintenance  -     PSA, Annual Screen  (Prostatic-Specific Antigen)    Type II diabetes mellitus  -     pioglitazone (ACTOS) 30 MG tablet; TAKE ONE TABLET BY MOUTH DAILY  -     Glycosylated Hemoglobin A1c  -     Microalbumin, Random Urine    Pulmonary embolism  -     warfarin (COUMADIN) 5 MG tablet; TAKE AS DIRECTED  -     warfarin (COUMADIN) 1 MG tablet; Take 1 mg  tablet with 5 MG tablet ( 6 mg ) by mouth daily or as last directed.    Long term current use of anticoagulant therapy  -     warfarin (COUMADIN) 1 MG tablet; Take 1 mg  tablet with 5 MG tablet ( 6 mg ) by mouth daily or as last directed.    Hyperlipidemia  -     rosuvastatin (CRESTOR) 20 MG tablet; TAKE ONE TABLET BY MOUTH DAILY  -     Lipid Gibson FASTING    Reflux esophagitis  -     omeprazole (PRILOSEC) 20 MG capsule; TAKE ONE CAPSULE BY MOUTH DAILY    Essential hypertension  -     lisinopril (PRINIVIL,ZESTRIL) 5 MG tablet; TAKE ONE TABLET BY MOUTH DAILY  -     Comprehensive Metabolic Panel    Other orders  -     Cancel: Ambulatory referral for Colonoscopy      Patient is stable on current dose of medications and treated for type 2 diabetes, history of PE on anticoagulation, hyperlipidemia, reflux, and hypertension.  Medications are currently controlling symptoms well.  We had a discussion about colonoscopy, patient is apprehensive to pursue this.  Discussed importance of screening but that Maxim guard would be an option and he should check with his insurance.  He plans to do that he will let us know what he decides to pursue.  No other concerns today will follow for lab results and update the patient when available.  There are no Patient Instructions on file for this visit.    General  Immunizations reviewed and updated .  Alcohol use, safety and moderation discussed.  Recommended adequate calcium intake/osteoporosis prevention.  Discussed colon cancer screening at age 50, 45 if -American.  Diet and exercise reviewed, including goal of aerobic exercise 30-90 minutes most days of  the week, moderation of portions sizes, avoiding eating out and fast food and increase in fruits and vegetables.  Discussed safe sex practices.  Discussed & recommended seat belt (& motorcycle helmet) use.  Discussed & recommended smoke detector.  Discussed sun protection.  Discussed weight management.

## 2021-06-14 NOTE — PROGRESS NOTES
DIAGNOSIS:  1. Situational anxiety, stable    2. Essential hypertension , controlled    3. Type 2 diabetes mellitus without complication, without long-term current use of insulin (H), not ideally controlled        PLAN:     A1C in Feb after 2-11-21    Work on the diabetic diet.    Continue hydroxyzine prn    Continue present meds.            HPI:  Follow up on anxiety.  Using hydroxyzine prn with benefit.  Wife is out of the hospital.  Wife is now in a TCU for rehab.    Not eating right since his wife is not at home.              Current Outpatient Medications on File Prior to Visit   Medication Sig Dispense Refill     blood sugar diagnostic (ONE TOUCH ULTRA TEST) Strp Use As Directed.       FASENRA 30 mg/mL Syrg injection Inject 30 mg under the skin every 30 (thirty) days.       fluticasone propion-salmeterol (ADVAIR) 500-50 mcg/dose DISKUS Inhale 1 puff 2 (two) times a day. Wixela       hydrOXYzine pamoate (VISTARIL) 50 MG capsule Take 1-2 tabs orally at bedtime and may take if needed 1-2 times during the day. 30 capsule 2     lisinopriL (PRINIVIL,ZESTRIL) 5 MG tablet Take 1 tablet (5 mg total) by mouth daily. 90 tablet 3     multivitamin capsule Take 1 capsule by mouth daily.       omeprazole (PRILOSEC) 20 MG capsule Take 1 capsule (20 mg total) by mouth daily before breakfast. 90 capsule 3     ONE TOUCH ULTRASOFT LANCETS MISC Use As Directed.       pioglitazone (ACTOS) 30 MG tablet TAKE 1 TABLET(30 MG) BY MOUTH DAILY 90 tablet 3     predniSONE (DELTASONE) 1 MG tablet Take 5 mg by mouth daily.       rivaroxaban ANTICOAGULANT (XARELTO) 10 mg tablet Take 1 tablet (10 mg total) by mouth daily. 90 tablet 3     rosuvastatin (CRESTOR) 20 MG tablet Take 1 tablet (20 mg total) by mouth daily. 90 tablet 3     No current facility-administered medications on file prior to visit.        Pmh: reviewed  Psh: reviewed  Allergy:  reviewed      EXAM:    /85   Pulse 86   Temp 97.4  F (36.3  C) (Oral)   Resp 22   Wt (!)  307 lb 9.6 oz (139.5 kg)   BMI 40.58 kg/m    GEN:   ALERT, NAD, ORIENTED TIMES THREE  NECK: SUPPLE WITHOUT ADENOPATHY OR THYROMEGALY  LUNGS: CTA  COR: RRR WITHOUT MURMUR  EXT: WITHOUT EDEMA/SWELLING    No results found for this or any previous visit (from the past 168 hour(s)).  Results for orders placed or performed in visit on 11/11/20   Comprehensive Metabolic Panel   Result Value Ref Range    Sodium 140 136 - 145 mmol/L    Potassium 4.2 3.5 - 5.0 mmol/L    Chloride 104 98 - 107 mmol/L    CO2 27 22 - 31 mmol/L    Anion Gap, Calculation 9 5 - 18 mmol/L    Glucose 151 (H) 70 - 125 mg/dL    BUN 16 8 - 22 mg/dL    Creatinine 0.86 0.70 - 1.30 mg/dL    GFR MDRD Af Amer >60 >60 mL/min/1.73m2    GFR MDRD Non Af Amer >60 >60 mL/min/1.73m2    Bilirubin, Total 0.8 0.0 - 1.0 mg/dL    Calcium 9.4 8.5 - 10.5 mg/dL    Protein, Total 6.4 6.0 - 8.0 g/dL    Albumin 4.1 3.5 - 5.0 g/dL    Alkaline Phosphatase 83 45 - 120 U/L    AST 14 0 - 40 U/L    ALT 19 0 - 45 U/L     Lab Results   Component Value Date    HGBA1C 7.9 (H) 11/11/2020

## 2021-06-14 NOTE — PROGRESS NOTES
DIAGNOSIS:  1. Situational anxiety  hydrOXYzine pamoate (VISTARIL) 50 MG capsule       PLAN:     Trial of hydroxyzine 50 mg tabs take 1-2 at night and 1 tab 1-2 times daily if needed.    Follow up in 2 weeks at which time we will recheck the BP.            HPI:   Wife with esophageal surgery.   Did chemo and XRT for 6 weeks and now after a month had esophageal surgery two days ago.  Paces at home.  Can't sit still.  Cant eat right.  Had a couple tylenol pms and got a little sleep last night.  Has no children.  His brother stopped over yesterday.  No h/o of underlying depression or anxiety.  ELISE-7:  21            Current Outpatient Medications on File Prior to Visit   Medication Sig Dispense Refill     blood sugar diagnostic (ONE TOUCH ULTRA TEST) Strp Use As Directed.       FASENRA 30 mg/mL Syrg injection Inject 30 mg under the skin every 30 (thirty) days.       fluticasone propion-salmeterol (ADVAIR) 500-50 mcg/dose DISKUS Inhale 1 puff 2 (two) times a day. Wixela       lisinopriL (PRINIVIL,ZESTRIL) 5 MG tablet Take 1 tablet (5 mg total) by mouth daily. 90 tablet 3     multivitamin capsule Take 1 capsule by mouth daily.       omeprazole (PRILOSEC) 20 MG capsule Take 1 capsule (20 mg total) by mouth daily before breakfast. 90 capsule 3     ONE TOUCH ULTRASOFT LANCETS MISC Use As Directed.       pioglitazone (ACTOS) 30 MG tablet TAKE 1 TABLET(30 MG) BY MOUTH DAILY 90 tablet 3     predniSONE (DELTASONE) 1 MG tablet Take 5 mg by mouth daily.       rivaroxaban ANTICOAGULANT (XARELTO) 10 mg tablet Take 1 tablet (10 mg total) by mouth daily. 90 tablet 3     rosuvastatin (CRESTOR) 20 MG tablet Take 1 tablet (20 mg total) by mouth daily. 90 tablet 3     No current facility-administered medications on file prior to visit.        Pmh: reviewed  Psh: reviewed  Allergy:  reviewed      EXAM:    /86   Pulse 92   Temp 98  F (36.7  C) (Oral)   Resp 24   Wt (!) 307 lb 9.6 oz (139.5 kg)   BMI 40.58 kg/m    GEN:   ALERT,  NAD, ORIENTED TIMES THREE  NECK: SUPPLE WITHOUT ADENOPATHY OR THYROMEGALY  LUNGS: CTA  COR: RRR WITHOUT MURMUR  SKIN: NO RASH , ULCERS OR LESIONS NOTED  EXT: WITHOUT EDEMA/SWELLING    No results found for this or any previous visit (from the past 168 hour(s)).

## 2021-06-14 NOTE — PATIENT INSTRUCTIONS - HE
Trial of hydroxyzine 50 mg tabs take 1-2 at night and 1 tab 1-2 times daily if needed.    Follow up in 2 weeks at which time we will recheck the BP.

## 2021-06-16 PROBLEM — E66.01 MORBID OBESITY (H): Status: ACTIVE | Noted: 2020-12-03

## 2021-06-18 NOTE — PROGRESS NOTES
Assessment & Plan:  1. Diabetes mellitus (H)  Patient with diabetes admits he has not been compliant with his diet.  Talked at length importance of dietary compliance to avoid complications.  Talked about the importance of exercise.  Recommended referral to [facility] which he will consider.  He will continue with his Actos at this time as his hemoglobin A1c is stable  - Glycosylated Hemoglobin A1c    2. Asthma  Patient with a history of asthma who is followed by pulmonology.  We signed a release so we can get records of his ACT score and asthma action plan.  Is currently stable on Advair as well is albuterol as needed.  He is currently on a steroid burst through the pulmonologist    3. Pulmonary embolism (H)  Patient with a history of PE.  He did have an elevated humeral homocystine level and has been recommended to be on lifelong anticoagulation.  We talked today about trying 1 of the NOAC medications rather than warfarin.  He will investigate if his insurance will cover it and let me know if he would like to change  - INR    4. Special screening for malignant neoplasms, colon  She is due for colon cancer screening.  We discussed risks and benefits of the various screening options and he has chosen color guard.  - Cologuard; Future        Orders Placed This Encounter   Procedures     Glycosylated Hemoglobin A1c     Cologuard     Will check insurance     Standing Status:   Future     Standing Expiration Date:   6/27/2019     Medications Discontinued During This Encounter   Medication Reason     warfarin (COUMADIN) 5 MG tablet Duplicate order     warfarin (COUMADIN) 5 MG tablet Duplicate order     rosuvastatin (CRESTOR) 20 MG tablet Duplicate order     predniSONE (DELTASONE) 5 MG tablet Dose adjustment     omeprazole (PRILOSEC) 20 MG capsule Duplicate order     lisinopril (PRINIVIL,ZESTRIL) 5 MG tablet Duplicate order     pioglitazone (ACTOS) 30 MG tablet Duplicate order       No Follow-up on  file.    I have had an Advance Directives discussion with the patient.  The following high BMI interventions were performed this visit: encouragement to exercise and lifestyle education regarding diet    Chief Complaint:   Chief Complaint   Patient presents with     Diabetes     Medication check - fasting      INR Check     Asthma     Followed by [facility] - GOSIA fillled out       Colon Cancer Screening     Discuss cologuard        History of Present Illness:  Maninder is a 53 y.o. male presenting to the clinic today for diabetes. He is fasting today. He notes his sugars have been high lately due to poor eating habits. He has his eyes checked regularly. His A1c was 7.3 today.     Asthma: He is seen by [facility] for asthma. He is taking prednisone 3 mg for asthma. He is using Advair daily and albuterol as needed.     Review of Systems:  He is interested in Cologuard. He has a history of a PE and is taking longterm anticoagulants; he is interested in Xarelto. He takes a multivitamin daily. All other systems are negative.     PFSH:  He has a history of a PE in 2009.     Tobacco Use:  History   Smoking Status     Passive Smoke Exposure - Never Smoker   Smokeless Tobacco     Never Used     Comment: As a child, parents smoked.        Vitals:  Vitals:    06/27/18 0833   BP: 118/80   Patient Site: Right Arm   Patient Position: Sitting   Cuff Size: Adult Large   Pulse: 78   Resp: 18   Temp: 97.5  F (36.4  C)   TempSrc: Oral   Weight: (!) 307 lb (139.3 kg)     Wt Readings from Last 3 Encounters:   06/27/18 (!) 307 lb (139.3 kg)   11/03/17 (!) 311 lb 9 oz (141.3 kg)   11/16/16 (!) 315 lb 4 oz (143 kg)     Body mass index is 39.42 kg/(m^2).      Physical Exam:  Constitutional:  Reveals an alert, cooperative, pleasant male in no acute distress.  Vitals:  Per nursing notes.  Neck:  Supple, no lymphadenopathy,  thyroid not palpable.  Cardiac:  Regular rate and rhythm without murmurs,  rubs, or gallops. Carotids without bruits. Legs without edema.   Lungs: Clear.  Respiratory effort normal.  Neurologic:  No gross focal deficits.    Psychiatric:  Mood appropriate, memory intact.     Data Reviewed:  Additional history summarized (from old records or history from someone other than the patient or another healthcare provider) (2 TOTAL): Reviewed hem/onc note from 2010 regarding follow up from a PE.     Decision to obtain extra information (old records requested or history from another person or accessing Care Everywhere) (1 TOTAL): None.     Radiology tests summarized or ordered (XRAY/CT/MRI/DXA) (1 TOTAL): None.    Labs reviewed or ordered (1 TOTAL): Reviewed and ordered labs.     Medicine tests summarized or ordered (EKG/ECHO) (1 TOTAL): None.    Independent review of EKG or X-Ray (2 EACH): None.       The visit lasted a total of 28 minutes face to face with the patient. Over 50% of the time was spent counseling and educating the patient about medication management.    I, Carmelina aHrris, am scribing for and in the presence of, Dr. Samano.    IYessy MD, personally performed the services described in this documentation, as scribed by Carmelina Harris in my presence, and it is both accurate and complete.    Medications:  Current Outpatient Prescriptions   Medication Sig Dispense Refill     albuterol (PROVENTIL) 2.5 mg /3 mL (0.083 %) nebulizer solution 2.5 mg every 4 (four) hours as needed.        blood sugar diagnostic (ONE TOUCH ULTRA TEST) Strp Use As Directed.       fluticasone-salmeterol (ADVAIR DISKUS) 500-50 mcg/dose DISKUS Inhale 1 puff 2 (two) times a day.       lisinopril (PRINIVIL,ZESTRIL) 5 MG tablet TAKE 1 TABLET BY MOUTH DAILY 30 tablet 11     multivitamin capsule Take 1 capsule by mouth daily.       omalizumab (XOLAIR) 150 mg injection Inject 225 mg under the skin every 14 (fourteen) days.       omeprazole (PRILOSEC) 20 MG capsule TAKE ONE CAPSULE BY MOUTH DAILY 30 capsule 11      ONE TOUCH ULTRASOFT LANCETS MISC Use As Directed.       pioglitazone (ACTOS) 30 MG tablet TAKE 1 TABLET BY MOUTH DAILY 30 tablet 5     predniSONE (DELTASONE) 1 MG tablet Take 3 mg by mouth daily.        rosuvastatin (CRESTOR) 20 MG tablet TAKE 1 TABLET BY MOUTH DAILY 30 tablet 11     warfarin (COUMADIN) 1 MG tablet Take 1 mg  tablet with 5 MG tablet ( 6 mg ) by mouth daily or as last directed. 30 tablet 11     warfarin (COUMADIN) 5 MG tablet Take 1 tablet (5mg) by mouth daily, as directed.  Along with warfarin 1mg, for total of 6mg per day.  Adjust dose based on INR results. 100 tablet 1     No current facility-administered medications for this visit.        Total Data Points: 3

## 2021-06-19 NOTE — LETTER
Letter by Isma Stringer MD at      Author: Isma Stringer MD Service: -- Author Type: --    Filed:  Encounter Date: 12/4/2019 Status: Signed         Maninder Lacy  [address] [facility] [address]             December 4, 2019         Dear Mr. Lacy,    Below are the results from your recent visit:    Resulted Orders   PSA (Prostatic-Specific Antigen), Annual Screen   Result Value Ref Range    PSA 0.5 0.0 - 3.5 ng/mL    Narrative    Method is Abbott Prostate-Specific Antigen (PSA)  Standard-WHO 1st International (90:10)   Glycosylated Hemoglobin A1c   Result Value Ref Range    Hemoglobin A1c 7.1 (H) 3.5 - 6.0 %   Microalbumin, Random Urine   Result Value Ref Range    Microalbumin, Random Urine 2.32 (H) 0.00 - 1.99 mg/dL    Creatinine, Urine 225.3 mg/dL    Microalbumin/Creatinine Ratio Random Urine 10.3 <=19.9 mg/g    Narrative    Microalbumin, Random Urine  <2.0 mg/dL . . . . . . . . Normal  3.0-30.0 mg/dL . . . . . . Microalbuminuria  >30.0 mg/dL . . . . . .  . Clinical Proteinuria    Microalbumin/Creatinine Ratio, Random Urine  <20 mg/g . . . . .. . . . Normal   mg/g . . . . . . . Microalbuminuria  >300 mg/g . . . . . . . . Clinical Proteinuria       Comprehensive Metabolic Panel   Result Value Ref Range    Sodium 141 136 - 145 mmol/L    Potassium 4.2 3.5 - 5.0 mmol/L    Chloride 106 98 - 107 mmol/L    CO2 24 22 - 31 mmol/L    Anion Gap, Calculation 11 5 - 18 mmol/L    Glucose 136 (H) 70 - 125 mg/dL    BUN 13 8 - 22 mg/dL    Creatinine 0.83 0.70 - 1.30 mg/dL    GFR MDRD Af Amer >60 >60 mL/min/1.73m2    GFR MDRD Non Af Amer >60 >60 mL/min/1.73m2    Bilirubin, Total 0.9 0.0 - 1.0 mg/dL    Calcium 9.4 8.5 - 10.5 mg/dL    Protein, Total 6.6 6.0 - 8.0 g/dL    Albumin 4.2 3.5 - 5.0 g/dL    Alkaline Phosphatase 86 45 - 120 U/L    AST 18 0 - 40 U/L    ALT 23 0 - 45 U/L    Narrative    Fasting Glucose reference range is 70-99 mg/dL per  American Diabetes Association (ADA) guidelines.    Lipid Profile   Result Value Ref Range    Triglycerides 94 <=149 mg/dL    Cholesterol 139 <=199 mg/dL    LDL Calculated 57 <=129 mg/dL    HDL Cholesterol 63 >=40 mg/dL    Patient Fasting > 8hrs? Yes    Vitamin D, Total (25-Hydroxy)   Result Value Ref Range    Vitamin D, Total (25-Hydroxy) 29.8 (L) 30.0 - 80.0 ng/mL    Narrative    Deficiency <10.0 ng/mL  Insufficiency 10.0-29.9 ng/mL  Sufficiency 30.0-80.0 ng/mL  Toxicity (possible) >100.0 ng/mL       Romulo Dickens:  Your diabetes is under fairly good control;  A1C goal is <7.0 and you are very close!  The cholesterol panel is excellent.  The Vit D is at the lower limits of normal.  You may benefit from an additional 400-800 units daily of Vit D.  The PSA level is normal and about the same as the past two years.  Continue to check the PSA annually.  There were no significant traces of protein in the urine and your kidney function is normal.  Also the potassium and liver function are normal.  Follow up in 3 months for a diabetic check and to recheck the A1C.  It was a pleasure seeing you in clinic.  Dr Stringer    Please call with questions or contact us using [employer].    Sincerely,        Electronically signed by Isma Stringer MD

## 2021-06-19 NOTE — LETTER
Letter by Isma Stringer MD at      Author: Isma Stringer MD Service: -- Author Type: --    Filed:  Encounter Date: 12/2/2019 Status: Signed         December 2, 2019     Patient: Maninder Lacy   YOB: 1965   Date of Visit: 12/2/2019       To Whom it May Concern:    Maninder Lacy was seen in my clinic on 12/2/2019.    If you have any questions or concerns, please don't hesitate to call.    Sincerely,         Electronically signed by Isma Stringer MD

## 2021-06-19 NOTE — LETTER
Letter by Isma Stringer MD at      Author: Isma Stringer MD Service: -- Author Type: --    Filed:  Encounter Date: 12/2/2019 Status: Signed       My Asthma Action Plan    Name: Maninder Lacy   YOB: 1965  Date: 12/2/2019   My doctor: Isma Stringer MD   My clinic: [facility] MEDICINE/OB        My Control Medicine: Fluticasone propionate + salmeterol (Advair Diskus or Wixela Inhub) -  500/50 mcg   My Rescue Medicine: Albuterol (Proair/Ventolin/Proventil HFA) 2-4 puffs EVERY 4 HOURS as needed. Use a spacer if recommended by your provider.   My Oral Steroid Medicine: PREDNISONE My Asthma Severity:   Moderate Persistent  Know your asthma triggers: upper respiratory infections, dust mites and pollens               GREEN ZONE   Good Control    I feel good    No cough or wheeze    Can work, sleep and play without asthma symptoms     Take your asthma control medicine every day.     1. If exercise triggers your asthma, take your rescue medication    15 minutes before exercise or sports, and    During exercise if you have asthma symptoms  2. Spacer to use with inhaler: If you have a spacer, make sure to use it with your inhaler             YELLOW ZONE Getting Worse  I have ANY of these:    I do not feel good    Cough or wheeze    Chest feels tight    Wake up at night 1. Keep taking your Green Zone medications  2. Start taking your rescue medicine:    every 20 minutes for up to 1 hour. Then every 4 hours for 24-48 hours.  3. If you stay in the Yellow Zone for more than 12-24 hours, contact your doctor.  4. If you do not return to the Green Zone in 12-24 hours or you get worse, start taking your oral steroid medicine if prescribed by your provider.           RED ZONE Medical Alert - Get Help  I have ANY of these:    I feel awful    Medicine is not helping    Breathing getting harder    Trouble walking or talking    Nose opens wide to breathe     1. Take your rescue medicine  NOW  2. If your provider has prescribed an oral steroid medicine, start taking it NOW  3. Call your doctor NOW  4. If you are still in the Red Zone after 20 minutes and you have not reached your doctor:    Take your rescue medicine again and    Call 911 or go to the emergency room right away    See your regular doctor within 2 weeks of an Emergency Room or Urgent Care visit for follow-up treatment.          Annual Reminders:  Meet with Asthma Educator,  Flu Shot in the Fall, consider Pneumonia Vaccination for patients with asthma (aged 19 and older).    Pharmacy:   [id number] - [address] [facility] [place] [address] [facility] [address]  [facility] [address]  Phone: [phone] Fax: [phone]      Provider signature: Electronically Signed by Esha Dean   Date: 12/02/19                      Asthma Triggers  How To Control Things That Make Your Asthma Worse    Triggers are things that make your asthma worse.  Look at the list below to help you find your triggers and what you can do about them.  You can help prevent asthma flare-ups by staying away from your triggers.      Trigger                                                          What you can do   Cigarette Smoke  Tobacco smoke can make asthma worse. Do not allow smoking in your home, car or around you.  Be sure no one smokes at a lázaro day care or school.  If you smoke, ask your health care provider for ways to help you quit.  Ask family members to quit too.  Ask your health care provider for a referral to Quit Plan to help you quit smoking, or call 1-666-431-PLAN.     Colds, Flu, Bronchitis  These are common triggers of asthma. Wash your hands often.  Dont touch your eyes, nose or mouth.  Get a flu shot every year.     Dust Mites  These are tiny bugs that live in cloth or carpet. They are too small to see. Wash sheets and blankets in hot water every week.   Encase pillows and mattress in dust  mite proof covers.  Avoid having carpet if you can. If you have carpet, vacuum weekly.   Use a dust mask and HEPA vacuum.   Pollen and Outdoor Mold  Some people are allergic to trees, grass, or weed pollen, or molds. Try to keep your windows closed.  Limit time out doors when pollen count is high.   Ask you health care provider about taking medicine during allergy season.     Animal Dander  Some people are allergic to skin flakes, urine or saliva from pets with fur or feathers. Keep pets with fur or feathers out of your home.    If you cant keep the pet outdoors, then keep the pet out of your bedroom.  Keep the bedroom door closed.  Keep pets off cloth furniture and away from stuffed toys.     Mice, Rats, and Cockroaches   Some people are allergic to the waste from these pests.   Cover food and garbage.  Clean up spills and food crumbs.  Store grease in the refrigerator.   Keep food out of the bedroom.   Indoor Mold  This can be a trigger if your home has high moisture. Fix leaking faucets, pipes, or other sources of water.   Clean moldy surfaces.  Dehumidify basement if it is damp and smelly.   Smoke, Strong Odors, and Sprays  These can reduce air quality. Stay away from strong odors and sprays, such as perfume, powder, hair spray, paints, smoke incense, paint, cleaning products, candles and new carpet.   Exercise or Sports  Some people with asthma have this trigger. Be active!  Ask your doctor about taking medicine before sports or exercise to prevent symptoms.    Warm up for 5-10 minutes before and after sports or exercise.     Other Triggers of Asthma  Cold air:  Cover your nose and mouth with a scarf.  Sometimes laughing or crying can be a trigger.  Some medicines and food can trigger asthma.

## 2021-06-19 NOTE — LETTER
Letter by Parris Berman FNP at      Author: Parris Berman FNP Service: -- Author Type: --    Filed:  Encounter Date: 4/26/2019 Status: (Other)         Maninder Lacy  [address] [facility] [address]                                  April 29, 2019    Patient: Maninder Lacy   MR Number: [id number]   YOB: 1965   Date of Visit: 4/26/2019     Dear Mr. Lacy:          If you have questions, please do not hesitate to call me.    Sincerely,        DAREK Harris          CC  No Recipients

## 2021-06-19 NOTE — LETTER
Letter by Parris Berman FNP at      Author: Parris Berman FNP Service: -- Author Type: --    Filed:  Encounter Date: 4/29/2019 Status: (Other)         Maninder NASSAR Bambi  [address] [facility] [address]                                  April 29, 2019    Please see below, your recent lab results and Stress test results.      Component      Latest Ref Rng & Units 4/4/2019   WBC      4.0 - 11.0 thou/uL 7.5   RBC      4.40 - 6.20 mill/uL 4.84   Hemoglobin      14.0 - 18.0 g/dL 14.0   Hematocrit      40.0 - 54.0 % 42.6   MCV      80 - 100 fL 88   MCH      27.0 - 34.0 pg 28.9   MCHC      32.0 - 36.0 g/dL 32.9   RDW      11.0 - 14.5 % 12.8   Platelets      140 - 440 thou/uL 262   MPV      7.0 - 10.0 fL 8.3   Neutrophils %      50 - 70 % 76 (H)   Lymphocytes %      20 - 40 % 12 (L)   Monocytes %      2 - 10 % 7   Eosinophils %      0 - 6 % 4   Basophils %      0 - 2 % 0   Neutrophils Absolute      2.0 - 7.7 thou/uL 5.7   Lymphocytes Absolute      0.8 - 4.4 thou/uL 0.9   Monocytes Absolute      0.0 - 0.9 thou/uL 0.5   Eosinophils Absolute      0.0 - 0.4 thou/uL 0.3   Basophils Absolute      0.0 - 0.2 thou/uL 0.0   Sodium      136 - 145 mmol/L 137   Potassium      3.5 - 5.0 mmol/L 4.7   Chloride      98 - 107 mmol/L 103   CO2      22 - 31 mmol/L 22   Anion Gap, Calculation      5 - 18 mmol/L 12   Glucose      70 - 125 mg/dL 202 (H)   BUN      8 - 22 mg/dL 16   Creatinine      0.70 - 1.30 mg/dL 0.86   GFR MDRD Af Amer      >60 mL/min/1.73m2 >60   GFR MDRD Non Af Amer      >60 mL/min/1.73m2 >60   Bilirubin, Total      0.0 - 1.0 mg/dL 0.6   Calcium      8.5 - 10.5 mg/dL 10.0   Protein, Total      6.0 - 8.0 g/dL 7.0   ALBUMIN      3.5 - 5.0 g/dL 4.2   Alkaline Phosphatase      45 - 120 U/L 88   AST      0 - 40 U/L 17   ALT      0 - 45 U/L 20   Hemoglobin A1c      3.5 - 6.0 % 6.6 (H)   TSH      0.30 - 5.00 uIU/mL 0.56   Vitamin D, Total (25-Hydroxy)      30.0 - 80.0 ng/mL 33.4   Vitamin B-12      213 - 816 pg/mL 507   INR      0.90  - 1.10 1.00

## 2021-06-20 NOTE — LETTER
Letter by Isma Stringer MD at      Author: Isma Stringer MD Service: -- Author Type: --    Filed:  Encounter Date: 12/26/2019 Status: Signed         Maninder Lacy  [address] [facility] [address]             December 26, 2019         Dear Mr. Lacy,    Your recent cologuard  Testing was NEGATIVE.  It should be repeated in 3 years.    Please call with questions or contact us using [employer].    Sincerely,        Electronically signed by Isma Stringer MD

## 2021-06-21 NOTE — PROGRESS NOTES
Assessment:      Healthy male exam.      Plan:       All questions answered.  Blood tests: Comprehensive metabolic panel, Lipoproteins, Total cholesterol and TSH.   INR, Microalbumin, Vit D labs also completed and pending.     Asthma: defer to allergist/asthma provider. Updated asthma action plan and asthma control in clinic today. Patient denies any wheezing, cough, or asthma symptoms today.     Diabetes: A1C today: 7.0%, decreased from 6/18 (7.3%). No changes to blood sugars. Continues on pioglitozone 30mg daily. Pedal pulses intact with good sensation noted on microfilament testing. Microalbuminuria urine test pending.     Fatigue: Fatigue most likely related to Prednisone changes, as patient endorses fatigue began with Prednisone adjustments. Labs drawn to rule out anemia, thyroid, and Vit D deficiency. Was previously on Vit D supplement for low level several years ago. Last Vit D on 6/17 was in range at 35.8. Patient will be updated on labs. If labs are unremarkable, would suggest monitoring fatigue further out from the Prednisone change and can address fatigue in follow-up visit.     Health maintenance: already received flu shot. Does not want colonoscopy; [place] guard discussed and patient open to trying this. Will verify that patient has gotten Cologuard kit at home. No medications at this time, but will follow-up with previous provider regarding Xarelto change from Warfarin.  Medication refills sent at this time and will notify patient when able to change medications. Will send lab results for patient to view and follow-up if any updates or medications needed.      Subjective:      Maninder Lacy is a 53 y.o. male who presents for an annual exam. The patient reports that there is not domestic violence in his life. Past medical history includes asthma, diabetes, history of PE. Diabetes maintenance today includes a microalbuminuria sample, lipids, foot exam, A1C. PSA screening due today as well. Patient does not  want to have colonoscopy done, so [place] guard test was discussed and patient has this kit and home and will check for insurance coverage before sending it in. Vaccinations current and flu shot previously done. He expresses interest in switching Warfarin to Xarelto, which was previously discussed; patient knows Xarelto will be covered by insurance and is wanting to change.     Asthma: He is followed by [facility] for asthma and allergies. He reports having recent changes with his daily Prednisone dose.  He was previously taking Prednisone 5mg daily for a long time, which was recently lowered by his asthma provider to Prednisone 3mg daily. This dose exacerbated his asthma/allergy symptoms and needed a L eustachian tube placed. Prednisone was increased to 4mg daily in mid October, which he states he is tolerated much better.     Diabetes: No changes to blood sugars; checks BG every am. States no changes to diet. Receives eye exam every year; denies vision changes. Denies skin concerns.     Fatigue: He reports ongoing fatigue that started with Prednisone dose adjustments. He is not aware of thyroid or anemic history.     Healthy Habits:   Regular Exercise: No  Sunscreen Use: N/A  Healthy Diet: No  Dental Visits Regularly: Yes  Seat Belt: Yes  Sexually active: N/A  Monthly Self Testicular Exams:  N/A  Hemoccults: No  Flex Sig: No  Colonoscopy: No  Lipid Profile: Yes  Glucose Screen: Yes  Prevention of Osteoporosis: No  Last Dexa: N/A  Guns at Home:  N/A      Immunization History   Administered Date(s) Administered     DT (pediatric) 04/01/1997     Influenza, inj, historic,unspecified 10/09/2007, 10/14/2008, 09/18/2017     Influenza, seasonal,quad inj 6-35 mos 09/14/2009, 10/18/2010, 10/04/2011, 09/26/2012     Pneumo Polysac 23-V 11/05/2004, 09/26/2012     Tdap 11/17/2008     Immunization status: up to date and documented.    No exam data present     Current Outpatient Medications   Medication Sig  Dispense Refill     albuterol (PROVENTIL) 2.5 mg /3 mL (0.083 %) nebulizer solution 2.5 mg every 4 (four) hours as needed.        blood sugar diagnostic (ONE TOUCH ULTRA TEST) Strp Use As Directed.       fluticasone-salmeterol (ADVAIR DISKUS) 500-50 mcg/dose DISKUS Inhale 1 puff 2 (two) times a day.       lisinopril (PRINIVIL,ZESTRIL) 5 MG tablet Take 1 tablet (5 mg total) by mouth daily. 30 tablet 11     multivitamin capsule Take 1 capsule by mouth daily.       omalizumab (XOLAIR) 150 mg injection Inject 225 mg under the skin every 14 (fourteen) days.       omeprazole (PRILOSEC) 20 MG capsule TAKE ONE CAPSULE BY MOUTH DAILY. 30 capsule 11     ONE TOUCH ULTRASOFT LANCETS MISC Use As Directed.       predniSONE (DELTASONE) 1 MG tablet Take 3 mg by mouth daily.        warfarin (COUMADIN/JANTOVEN) 1 MG tablet Take 1 mg  tablet with 5 MG tablet ( 6 mg ) by mouth daily or as last directed.. 30 tablet 11     warfarin (COUMADIN/JANTOVEN) 5 MG tablet Take 1 tablet (5mg) by mouth daily, as directed.  Along with warfarin 1mg, for total of 6mg per day.  Adjust dose based on INR results.. 30 tablet 1     pioglitazone (ACTOS) 30 MG tablet Take 1 tablet (30 mg total) by mouth daily. 30 tablet 11     rosuvastatin (CRESTOR) 20 MG tablet Take 1 tablet (20 mg total) by mouth daily. 30 tablet 11     No current facility-administered medications for this visit.      Past Medical History:   Diagnosis Date     Breast injury 04/2016     Pulmonary edema      Past Surgical History:   Procedure Laterality Date     ME KNEE SCOPE,DIAGNOSTIC      Description: Arthroscopy Knee Right;  Recorded: 08/06/2010;  Comments: august, 2010 for medial meniscal tear     Aspirin; Cephalexin; and Theophylline  Family History   Problem Relation Age of Onset     Cancer Father         stomach and bladder     Breast cancer Paternal Aunt      Social History     Socioeconomic History     Marital status:      Spouse name: Not on file     Number of children: Not  "on file     Years of education: Not on file     Highest education level: Not on file   Social Needs     Financial resource strain: Not on file     Food insecurity - worry: Not on file     Food insecurity - inability: Not on file     Transportation needs - medical: Not on file     Transportation needs - non-medical: Not on file   Occupational History     Not on file   Tobacco Use     Smoking status: Passive Smoke Exposure - Never Smoker     Smokeless tobacco: Never Used     Tobacco comment: As a child, parents smoked.    Substance and Sexual Activity     Alcohol use: Not on file     Drug use: Not on file     Sexual activity: Not on file   Other Topics Concern     Not on file   Social History Narrative     Not on file       Review of Systems  Review of Systems   Constitutional: Negative for activity change, appetite change, fatigue and fever.   HENT: Positive for congestion. Negative for ear pain.         L ear eustachian tube present   Eyes: Negative for visual disturbance.   Respiratory: Negative for cough and shortness of breath.    Cardiovascular: Negative for chest pain and leg swelling.   Gastrointestinal: Negative for abdominal pain, constipation and diarrhea.   Genitourinary: Negative for decreased urine volume, difficulty urinating, testicular pain and urgency.   Skin: Negative for wound.   Neurological: Negative for dizziness and headaches.             Objective:     Vitals:    11/27/18 1033 11/27/18 1036   BP: 147/83 130/82   Pulse: 79 81   Resp: 16    Temp: (!) 96.4  F (35.8  C)    TempSrc: Oral    SpO2: 96%    Weight: (!) 305 lb 12.8 oz (138.7 kg)    Height: 6' 0.09\" (1.831 m)      Body mass index is 41.37 kg/m .    Physical  Physical Exam   Constitutional: He is oriented to person, place, and time. No distress.   HENT:   Head: Normocephalic.   Nose: No mucosal edema or rhinorrhea. Right sinus exhibits no maxillary sinus tenderness and no frontal sinus tenderness. Left sinus exhibits no maxillary sinus " tenderness and no frontal sinus tenderness.   Mouth/Throat: Uvula is midline and oropharynx is clear and moist.   L ear; eustachian tube present    Neck: Neck supple. No edema present. No thyromegaly present.   Cardiovascular: Regular rhythm, S1 normal, S2 normal and intact distal pulses. Exam reveals no gallop.   No murmur heard.  Pulses:       Dorsalis pedis pulses are 2+ on the right side, and 2+ on the left side.        Posterior tibial pulses are 2+ on the right side, and 2+ on the left side.   Pulmonary/Chest:   Lung sounds clear bilaterally, without wheezing, stridor, rhonchi   Abdominal: Soft. Bowel sounds are normal. There is no tenderness.   Musculoskeletal: Normal range of motion.   Lymphadenopathy:     He has no cervical adenopathy.   Neurological: He is alert and oriented to person, place, and time. He has normal strength and normal reflexes.   Skin: Skin is dry and intact.   Vitals reviewed.         I, Angela Leavitt NP student, personally performed the services described in this documentation with Parris Berman CNP in my presence and performing the same services, and it is both accurate and complete.

## 2021-06-21 NOTE — LETTER
Letter by Isma Stringer MD at      Author: Isma Stringer MD Service: -- Author Type: --    Filed:  Encounter Date: 11/17/2020 Status: (Other)         Maninder Lacy  [address] [facility] [address]             November 17, 2020         Dear Mr. Lacy,    Below are the results from your recent visit:    Resulted Orders   Vitamin D, Total (25-Hydroxy)   Result Value Ref Range    Vitamin D, Total (25-Hydroxy) 33.3 30.0 - 80.0 ng/mL    Narrative    Deficiency <10.0 ng/mL  Insufficiency 10.0-29.9 ng/mL  Sufficiency 30.0-80.0 ng/mL  Toxicity (possible) >100.0 ng/mL   Comprehensive Metabolic Panel   Result Value Ref Range    Sodium 140 136 - 145 mmol/L    Potassium 4.2 3.5 - 5.0 mmol/L    Chloride 104 98 - 107 mmol/L    CO2 27 22 - 31 mmol/L    Anion Gap, Calculation 9 5 - 18 mmol/L    Glucose 151 (H) 70 - 125 mg/dL    BUN 16 8 - 22 mg/dL    Creatinine 0.86 0.70 - 1.30 mg/dL    GFR MDRD Af Amer >60 >60 mL/min/1.73m2    GFR MDRD Non Af Amer >60 >60 mL/min/1.73m2    Bilirubin, Total 0.8 0.0 - 1.0 mg/dL    Calcium 9.4 8.5 - 10.5 mg/dL    Protein, Total 6.4 6.0 - 8.0 g/dL    Albumin 4.1 3.5 - 5.0 g/dL    Alkaline Phosphatase 83 45 - 120 U/L    AST 14 0 - 40 U/L    ALT 19 0 - 45 U/L    Narrative    Fasting Glucose reference range is 70-99 mg/dL per  American Diabetes Association (ADA) guidelines.   Lipid [place] FASTING   Result Value Ref Range    Cholesterol 138 <=199 mg/dL    Triglycerides 87 <=149 mg/dL    HDL Cholesterol 62 >=40 mg/dL    LDL Calculated 59 <=129 mg/dL    Patient Fasting > 8hrs? Yes    Glycosylated Hemoglobin A1c   Result Value Ref Range    Hemoglobin A1c 7.9 (H) <=5.6 %      Comment:      Normal <5.7% Prediabete 5.7-6.4% Diabletes 6.5% or higher - adopted from ADA consensus guidelines   PSA, Annual Screen (Prostatic-Specific Antigen)   Result Value Ref Range    PSA 0.4 0.0 - 3.5 ng/mL    Narrative    Method is Abbott Prostate-Specific Antigen (PSA)  Standard-WHO 1st International  (90:10)   Microalbumin, Random Urine   Result Value Ref Range    Microalbumin, Random Urine 12.64 (H) 0.00 - 1.99 mg/dL    Creatinine, Urine 147.1 mg/dL    Microalbumin/Creatinine Ratio Random Urine 85.9 (H) <=19.9 mg/g    Narrative    Microalbumin, Random Urine  <2.0 mg/dL . . . . . . . . Normal  3.0-30.0 mg/dL . . . . . . Microalbuminuria  >30.0 mg/dL . . . . . .  . Clinical Proteinuria    Microalbumin/Creatinine Ratio, Random Urine  <20 mg/g . . . . .. . . . Normal   mg/g . . . . . . . Microalbuminuria  >300 mg/g . . . . . . . . Clinical Proteinuria           Romulo Dickens:  Your PSA normal and stable.  It should be rechecked in one year.  Your cholesterol panel is excellent.  The A1C shows sub-optimal diabetic control.  Please work on losing 10-20 lbs in the next 3  Months by adhering to the diabetic diet and getting some daily exercise.   Please bring in a log of recorded blood sugars to your upcoming appt.  There are some traces of protein in your urine and you are on an ACE inhibitor (lisinopril) to help protect your kidneys.  The actual kidney function is normal.  The remaining labs are normal.      Please call with questions or contact us using [employer].    Sincerely,        Electronically signed by Isma Stringer MD

## 2021-06-23 NOTE — TELEPHONE ENCOUNTER
Patient called back and reported that he is still currently on Warfarin and will be switching on Xarelto in February.    Discussed that he needs to have his INR check due to he is still on warfarin.  Appointment made for today.    Olena Matias RN

## 2021-06-23 NOTE — TELEPHONE ENCOUNTER
ANTICOAGULATION  MANAGEMENT    Assessment     Today's INR result of 3.1 is Supratherapeutic (goal INR of 2.0-3.0)        Warfarin taken as previously instructed    No new diet changes affecting INR    No new medication/supplements affecting INR    Continues to tolerate warfarin with no reported s/s of bleeding or thromboembolism     Previous INR was Therapeutic     Patient is planning to switch to Xarelto once he finishes his warfarin supply - most likely by first week of February    Plan:     Spoke with Maninder regarding INR result and instructed:     Warfarin Dosing Instructions:  take one time lower dose of 5 mg today then continue current warfarin dose    5 mg on Mon; 6 mg all other days     (0 % change)    Instructed patient to follow up no later than: 1-2 weeks or prior to switching to Xarelto - patient made aware that [facility] will have [facility] pharmacist talk to him prior to switching and answer questions that he might have.    Education provided: importance of therapeutic range, target INR goal and significance of current INR result and INR needed to be below 3 prior to switching to xarelto and no lab test required while on xarelto    Maninder verbalizes understanding and agrees to warfarin dosing plan.    Instructed to call the [facility] for any changes, questions or concerns. (#[phone])   ?   Olena Matias RN    Subjective/Objective:      Maninder Lacy, a 53 y.o. male is on warfarin.     Maninder reports:     Home warfarin dose: as updated on anticoagulation calendar per template     Missed doses: No     Medication changes:  No     S/S of bleeding or thromboembolism:  No     New Injury or illness:  No     Changes in diet or alcohol consumption:  No     Upcoming surgery, procedure or cardioversion:  No    Anticoagulation Episode Summary     Current INR goal:   2.0-3.0   TTR:   89.1 % (4 y)   Next INR check:   2/7/2019   INR from last check:   3.10! (1/24/2019)   Weekly max warfarin dose:      Target end date:       INR check location:      Preferred lab:      Send INR reminders to:   ANTICOAGULATION POOL C (DTN,VAD,CGR,GAV)    Indications    Pulmonary embolism (H) (Resolved) [I26.99]           Comments:            Anticoagulation Care Providers     Provider Role Specialty Phone number    Baldemar Lomas MD Referring Family Medicine [phone]

## 2021-06-23 NOTE — TELEPHONE ENCOUNTER
ANTICOAGULATION  MANAGEMENT PROGRAM    Maninder Lacy is overdue for INR check.  Reminder call made.    Left message for Maninder. If returning call, please schedule INR check as soon as possible.    Olena Matias RN

## 2021-06-23 NOTE — TELEPHONE ENCOUNTER
Call out to patient to confirm that he is now taking Xarelto versus warfarin.  Awaiting for call back.

## 2021-06-24 NOTE — TELEPHONE ENCOUNTER
ANTICOAGULATION  MANAGEMENT PROGRAM    Maninder Lacy is overdue for INR check.  Reminder call made.    Spoke with Maninder who states he is just taking his warfarin until he runs out and then is switching to Xarelto. He has about 7-0 days left and will call to check INR before switching.    Julia Meza

## 2021-06-25 NOTE — TELEPHONE ENCOUNTER
Who is calling:  Patient  Reason for Call:  Patient returning ACN call   Date of last appointment with primary care: n/a  Okay to leave a detailed message: Yes

## 2021-06-25 NOTE — TELEPHONE ENCOUNTER
INR today 1.8    Per note on encounter dated 2/28, patient is finishing up his warfarin supply and then will switch to Xarelto.    ACN called and spoke with patient's spouse and she cannot confirm when the patient took the last dose of warfarin and she stated that the patient is planning to start Xarelto this weekend.    Instructed Telma to have the patient call ACN back for any questions or concerns.    Olena Matias RN

## 2021-06-25 NOTE — TELEPHONE ENCOUNTER
ACN called and spoke with Maninder and he reported that he will take his last warfarin dose tonight and he will start taking the first Xarelto dose tomorrow.  Patient has no questions regarding Xarelto at this time.    Updated that ACN will follow up with him in 2 weeks to see how he is doing and encouraged to call [facility] @ [phone] for any questions and concerns.    Patient verbalized understanding and agrees to plan.    Olena Matias RN

## 2021-07-20 NOTE — PATIENT INSTRUCTIONS - HE
Evelin due Dec 2022    Check with insurance on Shingrix coverage    Med refills    Recheck A1C  after  2-11-21.  If it has not improved then consider adding glipizide of using one of the newer injectable meds.   Statement Selected

## 2021-07-26 ENCOUNTER — TRANSFERRED RECORDS (OUTPATIENT)
Dept: HEALTH INFORMATION MANAGEMENT | Facility: CLINIC | Age: 56
End: 2021-07-26

## 2021-07-26 LAB — RETINOPATHY: NEGATIVE

## 2021-10-28 ENCOUNTER — TRANSFERRED RECORDS (OUTPATIENT)
Dept: HEALTH INFORMATION MANAGEMENT | Facility: CLINIC | Age: 56
End: 2021-10-28

## 2022-02-15 DIAGNOSIS — E11.9 TYPE II DIABETES MELLITUS (H): ICD-10-CM

## 2022-02-15 RX ORDER — PIOGLITAZONEHYDROCHLORIDE 30 MG/1
30 TABLET ORAL DAILY
Qty: 90 TABLET | Refills: 0 | Status: SHIPPED | OUTPATIENT
Start: 2022-02-15 | End: 2022-11-15

## 2022-02-15 NOTE — TELEPHONE ENCOUNTER
Maninder stopped in and would like to have pioglatazone refilled.  Maninder has a med check appointment schedued with your for 3-8-2022.    Please call Maninder and let him know

## 2022-03-08 ENCOUNTER — OFFICE VISIT (OUTPATIENT)
Dept: FAMILY MEDICINE | Facility: CLINIC | Age: 57
End: 2022-03-08
Payer: COMMERCIAL

## 2022-03-08 VITALS
WEIGHT: 287 LBS | DIASTOLIC BLOOD PRESSURE: 70 MMHG | HEART RATE: 87 BPM | RESPIRATION RATE: 20 BRPM | BODY MASS INDEX: 37.87 KG/M2 | SYSTOLIC BLOOD PRESSURE: 137 MMHG

## 2022-03-08 DIAGNOSIS — E11.9 TYPE 2 DIABETES MELLITUS WITHOUT COMPLICATION, WITHOUT LONG-TERM CURRENT USE OF INSULIN (H): ICD-10-CM

## 2022-03-08 DIAGNOSIS — R94.39 ABNORMAL STRESS TEST: ICD-10-CM

## 2022-03-08 PROCEDURE — 99214 OFFICE O/P EST MOD 30 MIN: CPT | Performed by: FAMILY MEDICINE

## 2022-03-08 RX ORDER — ALBUTEROL SULFATE 90 UG/1
2 AEROSOL, METERED RESPIRATORY (INHALATION) EVERY 8 HOURS PRN
COMMUNITY

## 2022-03-08 RX ORDER — BENRALIZUMAB 30 MG/ML
30 INJECTION, SOLUTION SUBCUTANEOUS
COMMUNITY
Start: 2022-02-08

## 2022-03-08 NOTE — PROGRESS NOTES
DIAGNOSIS:  Encounter Diagnoses   Name Primary?     Type 2 diabetes mellitus without complication, without long-term current use of insulin (H)      Abnormal stress test (MILDLY ABNORMAL 4-23-19), SEE BELOW         PLAN:     Check A1C on 3-28-22    Will obtain full report from 2019 stress test    Consider a Nuclear Stress Test  At that time to follow up the one in 3 years ago. Prefer to do this before going up on     34  min spent with pt in reviewing hx, diabetes, and AIN  preparing plan for the future.    HPI:  Here for a diabetic discussion.  Has had a severe pancreatitis attack in the past on a med he can't recall.   Has been on Actos for about 10 years.  No shortness of breath other than that related to his asthma.  No chest discomfort.  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     Exercise nuclear stress test 4-23-19   with a small area of distal inferolateral to inferoapical ischemia.   EF 56% without wall motion abnormality  (low risk for cardiac ischemic events).        Current Outpatient Medications   Medication     albuterol (PROAIR HFA/PROVENTIL HFA/VENTOLIN HFA) 108 (90 Base) MCG/ACT inhaler     blood sugar diagnostic (ONE TOUCH ULTRA TEST) Strp     FASENRA PEN 30 MG/ML SOAJ auto-injector pen     fluticasone propion-salmeterol (ADVAIR) 500-50 mcg/dose DISKUS     hydrOXYzine pamoate (VISTARIL) 50 MG capsule     lisinopril (ZESTRIL) 5 MG tablet     multivitamin capsule     omeprazole (PRILOSEC) 20 MG DR capsule     pioglitazone (ACTOS) 30 MG tablet     predniSONE (DELTASONE) 1 MG tablet     rosuvastatin (CRESTOR) 20 MG tablet     XARELTO ANTICOAGULANT 10 MG TABS tablet     No current facility-administered medications for this visit.       Pmh: reviewed  Psh: reviewed  Allergy:  reviewed      EXAM:    /70 (BP Location: Left arm, Patient Position: Sitting, Cuff Size: Adult Large)   Pulse 87   Resp 20   Wt 130.2 kg (287 lb)   BMI 37.87 kg/m    GEN:   ALERT, NAD,  ORIENTED TIMES THREE  LUNGS: CTA  COR: RRR WITHOUT MURMUR  EXT: WITHOUT EDEMA/SWELLING    Last Comprehensive Metabolic Panel:  Sodium   Date Value Ref Range Status   11/11/2020 140 136 - 145 mmol/L Final     Potassium   Date Value Ref Range Status   11/11/2020 4.2 3.5 - 5.0 mmol/L Final     Chloride   Date Value Ref Range Status   11/11/2020 104 98 - 107 mmol/L Final     Carbon Dioxide (CO2)   Date Value Ref Range Status   11/11/2020 27 22 - 31 mmol/L Final     Anion Gap   Date Value Ref Range Status   11/11/2020 9 5 - 18 mmol/L Final     Glucose   Date Value Ref Range Status   11/11/2020 151 (H) 70 - 125 mg/dL Final     Urea Nitrogen   Date Value Ref Range Status   11/11/2020 16 8 - 22 mg/dL Final     Creatinine   Date Value Ref Range Status   11/11/2020 0.86 0.70 - 1.30 mg/dL Final     GFR Estimate   Date Value Ref Range Status   11/11/2020 >60 >60 mL/min/1.73m2 Final     Calcium   Date Value Ref Range Status   11/11/2020 9.4 8.5 - 10.5 mg/dL Final     Bilirubin Total   Date Value Ref Range Status   11/11/2020 0.8 0.0 - 1.0 mg/dL Final     Alkaline Phosphatase   Date Value Ref Range Status   11/11/2020 83 45 - 120 U/L Final     ALT   Date Value Ref Range Status   11/11/2020 19 0 - 45 U/L Final     AST   Date Value Ref Range Status   11/11/2020 14 0 - 40 U/L Final         COMP METABOLIC PANEL  Order: [id number]   Ref Range & Units 2 mo ago   SODIUM 135 - 145 mmol/L 138    POTASSIUM 3.5 - 5.0 mmol/L 4.3    CHLORIDE 98 - 110 mmol/L 100    CO2,TOTAL 21 - 31 mmol/L 31    ANION GAP 5 - 18 7    GLUCOSE 65 - 100 mg/dL 184 High     CALCIUM 8.5 - 10.5 mg/dL 9.6    BUN 8 - 25 mg/dL 15    CREATININE 0.72 - 1.25 mg/dL 0.85    BUN/CREAT RATIO           10 - 20 18    GFR if African American >60 ml/min/1.73m2 >60    GFR if not African American >60 ml/min/1.73m2 >60    ALBUMIN 3.5 - 5.2 g/dL 4.2    PROTEIN,TOTAL 6.0 - 8.0 g/dL 6.3    GLOBULIN                  2.0 - 3.7 g/dL 2.1    A/G RATIO 1.0 - 2.0 2.0    BILIRUBIN,TOTAL 0.2 -  1.2 mg/dL 1.0    ALK PHOSPHATASE 50 - 136 IU/L 79    ALT (SGPT) 8 - 45 IU/L 23    AST (SGOT) 2 - 40 IU/L 16    Resulting Agency  [facility]   Specimen Collected: 12/28/21  9:31 AM Last Resulted: 12/28/21  3:37 PM   Received From: [facility]  Result Received: 03/08/22  8:23 AM     Lab Results   Component Value Date    A1C 7.9 11/11/2020    A1C 7.1 12/02/2019    A1C 6.6 04/04/2019    A1C 7.0 11/27/2018    A1C 7.3 06/27/2018         A1C 8.4 on 12-28-22  Home blood sugars running a little higher than normal.

## 2022-03-08 NOTE — PATIENT INSTRUCTIONS
Check A1C on 3-28-22    Will obtain full report from 2019 stress test    Consider a Nuclear Stress Test  At that time to follow up the one in 3 years ago. Prefer to do this before going up on

## 2022-04-15 ENCOUNTER — LAB (OUTPATIENT)
Dept: LAB | Facility: CLINIC | Age: 57
End: 2022-04-15
Payer: COMMERCIAL

## 2022-04-15 DIAGNOSIS — E11.9 TYPE 2 DIABETES MELLITUS WITHOUT COMPLICATION, WITHOUT LONG-TERM CURRENT USE OF INSULIN (H): ICD-10-CM

## 2022-04-15 LAB — HBA1C MFR BLD: 8.5 % (ref 0–5.6)

## 2022-04-15 PROCEDURE — 83036 HEMOGLOBIN GLYCOSYLATED A1C: CPT

## 2022-04-15 PROCEDURE — 36415 COLL VENOUS BLD VENIPUNCTURE: CPT

## 2022-04-25 ENCOUNTER — TELEPHONE (OUTPATIENT)
Dept: FAMILY MEDICINE | Facility: CLINIC | Age: 57
End: 2022-04-25
Payer: COMMERCIAL

## 2022-04-25 DIAGNOSIS — E11.9 TYPE 2 DIABETES MELLITUS WITHOUT COMPLICATION, WITHOUT LONG-TERM CURRENT USE OF INSULIN (H): Primary | ICD-10-CM

## 2022-04-25 NOTE — TELEPHONE ENCOUNTER
I think I would like you to follow up with Endocrine before taking additional meds.  I have placed an Endocrine referral.

## 2022-04-25 NOTE — TELEPHONE ENCOUNTER
"Patient calling back about message about A1c lab results. He says he is willing to take Rybelsus If Dr Stringer thinks this will be the \"cure all\".  He would also be interested in seeing the endocrinologist if doctor prefers that.    Please call patient to advise  Before 2pm  Ok to leave a detailed message  "

## 2022-04-26 NOTE — TELEPHONE ENCOUNTER
Incoming call from patient wanting to let Dr. Stringer know his endocrine appt is not until Oct 25th. Pt states that's their first available. Pt doesn't think he should wait this long. Pt would like to see if provider recommends elsewhere to get in sooner or what pt should do.     Please reach back out to pt. OK to leave detailed message.

## 2022-04-27 DIAGNOSIS — E11.9 TYPE 2 DIABETES MELLITUS WITHOUT COMPLICATION, WITHOUT LONG-TERM CURRENT USE OF INSULIN (H): Primary | ICD-10-CM

## 2022-04-27 NOTE — TELEPHONE ENCOUNTER
Pt states understanding. Patient prefers to have phone call in the morning since he works in the afternoon.

## 2022-04-27 NOTE — TELEPHONE ENCOUNTER
I will put in a referral to one of our PharmD's (Jada Mckeon) who are experienced in diabetic meds and can be very helpful in making a selection.

## 2022-04-28 ENCOUNTER — TELEPHONE (OUTPATIENT)
Dept: FAMILY MEDICINE | Facility: CLINIC | Age: 57
End: 2022-04-28
Payer: COMMERCIAL

## 2022-04-28 NOTE — TELEPHONE ENCOUNTER
MTM referral from: [facility] visit (referral by provider)    MTM referral outreach attempt #1 on April 28, 2022 at 9:31 AM      Outcome: Patient is not interested at this time because his insurance does not cover MTM, will route to MTM Pharmacist/Provider as an FYI. Thank you for the referral.     Deanne Ken, MTM

## 2022-05-05 NOTE — PROGRESS NOTES
Clinical Pharmacy Consult:                                                    Maninder Lacy is a 57 year old male coming in for a clinical pharmacist consult.  He was referred to me from Dr. Stringer.     Reason for Consult: Discuss diabetes medications.     Discussion:     Currently taking pioglitazone 30 mg daily.   Patient is on prednisone daily - has been a forever due to his asthma. Has tried to taper but has been on it for so long that he gets pain and feels worse when he tries to taper off.   Severe pancreatitis attack in the past on a med he can't recall.   BG are worse since stress from wife. Also drinking a lot of coffee that contains carnation creamer.   History of terrible diarrhea with metformin   Did not tolerate glipizide (per chart review, hypoglycemia and/or diarrhea)  Rybelsus was discussed with patient but due to concern for pancreatitis he was restarted on Actos. Was scare of 45 mg and CV issues.   Could not get an endo appointment until October (cancelled)  Tests BG one times daily fasting AM. Reports blood sugars: 150s. Does not bring with him today.   Last A1c checked 4/15/2022 = 8.5%  Hypoglycemia none   Wt Readings from Last 3 Encounters:   03/08/22 287 lb (130.2 kg)   01/20/21 307 lb 9.6 oz (139.5 kg)   01/06/21 307 lb 9.6 oz (139.5 kg)     Reviewed options for his diabetes.  Per chart review, he was on glipizide in the past but it may have caused diarrhea and/or low blood sugars.  It is unclear what dose he was on.  We reviewed increasing pioglitazone and cardiovascular issues associated.  Third, since he has good insurance, an SGLT2 inhibitor would be an ideal option.  He was not interested in trying something totally new today, therefore we elected to start low-dose glipizide.  In the future, if he has low blood sugars or significant weight gain, will initiate an SGLT2 inhibitor.    Plan:  1. Start glipizide XL 2.5 mg daily     Jada Randall PharmSixtoD., [facility]   Medication Therapy  Management Pharmacist   [facility]

## 2022-05-06 ENCOUNTER — OFFICE VISIT (OUTPATIENT)
Dept: PHARMACY | Facility: CLINIC | Age: 57
End: 2022-05-06

## 2022-05-06 DIAGNOSIS — E11.9 TYPE 2 DIABETES MELLITUS WITHOUT COMPLICATION, WITHOUT LONG-TERM CURRENT USE OF INSULIN (H): Primary | ICD-10-CM

## 2022-05-06 PROCEDURE — 99207 PR NO CHARGE LOS: CPT | Performed by: PHARMACIST

## 2022-05-06 RX ORDER — GLIPIZIDE 2.5 MG/1
2.5 TABLET, EXTENDED RELEASE ORAL DAILY
Qty: 30 TABLET | Refills: 0 | Status: SHIPPED | OUTPATIENT
Start: 2022-05-06 | End: 2022-06-09

## 2022-05-06 RX ORDER — PREDNISONE 5 MG/1
5 TABLET ORAL DAILY
COMMUNITY
Start: 2022-04-14

## 2022-06-09 ENCOUNTER — TELEPHONE (OUTPATIENT)
Dept: FAMILY MEDICINE | Facility: CLINIC | Age: 57
End: 2022-06-09
Payer: COMMERCIAL

## 2022-06-09 DIAGNOSIS — E11.9 TYPE 2 DIABETES MELLITUS WITHOUT COMPLICATION, WITHOUT LONG-TERM CURRENT USE OF INSULIN (H): ICD-10-CM

## 2022-06-09 RX ORDER — GLIPIZIDE 2.5 MG/1
TABLET, EXTENDED RELEASE ORAL
Qty: 30 TABLET | Refills: 1 | Status: SHIPPED | OUTPATIENT
Start: 2022-06-09 | End: 2022-08-06

## 2022-06-09 NOTE — TELEPHONE ENCOUNTER
Patient calling to report that the glipizide is working well. His numbers have been ranging from 120-135    He wants to thank Jada for all the help.    No call back needed

## 2022-08-05 DIAGNOSIS — E11.9 TYPE 2 DIABETES MELLITUS WITHOUT COMPLICATION, WITHOUT LONG-TERM CURRENT USE OF INSULIN (H): ICD-10-CM

## 2022-08-06 RX ORDER — GLIPIZIDE 2.5 MG/1
TABLET, EXTENDED RELEASE ORAL
Qty: 30 TABLET | Refills: 1 | Status: SHIPPED | OUTPATIENT
Start: 2022-08-06 | End: 2022-10-09

## 2022-08-06 NOTE — TELEPHONE ENCOUNTER
"Routing refill request to provider for review/approval because:  Labs out of range:  a1c    Last Written Prescription Date:  6/9/22  Last Fill Quantity: 30,  # refills: 1   Last office visit provider:  3/8/22     Requested Prescriptions   Pending Prescriptions Disp Refills     glipiZIDE (GLUCOTROL XL) 2.5 MG 24 hr tablet [Pharmacy Med Name: GLIPIZIDE ER 2.5MG TABLETS] 30 tablet 1     Sig: TAKE 1 TABLET(2.5 MG) BY MOUTH DAILY       Sulfonylurea Agents Failed - 8/5/2022  9:49 AM        Failed - Patient has documented A1c within the specified period of time.     If HgbA1C is 8 or greater, it needs to be on file within the past 3 months.  If less than 8, must be on file within the past 6 months.     Recent Labs   Lab Test 04/15/22  1146   A1C 8.5*             Failed - Patient has a recent creatinine (normal) within the past 12 mos.     Recent Labs   Lab Test 11/11/20  0911   CR 0.86       Ok to refill medication if creatinine is low          Passed - Medication is active on med list        Passed - Patient is age 18 or older        Passed - Recent (6 mo) or future (30 days) visit within the authorizing provider's specialty     Patient had office visit in the last 6 months or has a visit in the next 30 days with authorizing provider or within the authorizing provider's specialty.  See \"Patient Info\" tab in inbasket, or \"Choose Columns\" in Meds & Orders section of the refill encounter.                 Alisha Hernandez RN 08/05/22 7:34 PM  "

## 2022-08-12 ENCOUNTER — TRANSFERRED RECORDS (OUTPATIENT)
Dept: HEALTH INFORMATION MANAGEMENT | Facility: CLINIC | Age: 57
End: 2022-08-12

## 2022-08-12 LAB — RETINOPATHY: NEGATIVE

## 2022-09-20 NOTE — TELEPHONE ENCOUNTER
RN cannot approve Refill Request    RN can NOT refill this medication Protocol failed and NO refill given. Last office visit: 4/4/2019 Parris Berman FNP Last Physical: 11/27/2018 Last MTM visit: Visit date not found Last visit same specialty: 4/4/2019 Parris Berman FNP.  Next visit within 3 mo: Visit date not found  Next physical within 3 mo: Visit date not found      Sera Guidry, [facility] Triage/Med Refill 8/24/2019    Requested Prescriptions   Pending Prescriptions Disp Refills     XARELTO 10 mg tablet [Pharmacy Med Name: XARELTO 10MG TABLETS] 90 tablet 0     Sig: TAKE 1 TABLET(10 MG) BY MOUTH DAILY       Apixaban/Rivaroxaban/Dabigatran Refill Protocol Failed - 8/24/2019  9:21 AM        Failed - Route to appropriate pool/provider     Last Anticoagulation Summary:           Passed - Renal function test in last year     Creatinine   Date Value Ref Range Status   04/04/2019 0.86 0.70 - 1.30 mg/dL Final             Passed - PCP or prescribing provider visit in past 12 months       Last office visit with prescriber/PCP: 4/4/2019 Parris Berman FNP OR same dept: 4/4/2019 Parris Berman FNP OR same specialty: 4/4/2019 Parris Berman FNP  Last physical: 11/27/2018 Last MTM visit: Visit date not found   Next visit within 3 mo: Visit date not found  Next physical within 3 mo: Visit date not found  Prescriber OR PCP: DAREK Harris  Last diagnosis associated with med order: There are no diagnoses linked to this encounter.  If protocol passes may refill for 12 months if within 3 months of last provider visit (or a total of 15 months).                 How Severe Is Your Cyst?: mild Is This A New Presentation, Or A Follow-Up?: Cyst

## 2022-10-09 DIAGNOSIS — E11.9 TYPE 2 DIABETES MELLITUS WITHOUT COMPLICATION, WITHOUT LONG-TERM CURRENT USE OF INSULIN (H): ICD-10-CM

## 2022-10-09 RX ORDER — GLIPIZIDE 2.5 MG/1
TABLET, EXTENDED RELEASE ORAL
Qty: 90 TABLET | Refills: 0 | Status: SHIPPED | OUTPATIENT
Start: 2022-10-09 | End: 2022-11-22

## 2022-10-09 NOTE — TELEPHONE ENCOUNTER
"Routing refill request to provider for review/approval because:  Labs out of range:  a1c  cr    Last Written Prescription Date:  8/6/22  Last Fill Quantity: 30,  # refills: 1   Last office visit provider:  3/8/22     Requested Prescriptions   Pending Prescriptions Disp Refills     glipiZIDE (GLUCOTROL XL) 2.5 MG 24 hr tablet [Pharmacy Med Name: GLIPIZIDE ER 2.5MG TABLETS] 30 tablet 1     Sig: TAKE 1 TABLET(2.5 MG) BY MOUTH DAILY       Sulfonylurea Agents Failed - 10/9/2022  9:53 AM        Failed - Patient has documented A1c within the specified period of time.     If HgbA1C is 8 or greater, it needs to be on file within the past 3 months.  If less than 8, must be on file within the past 6 months.     Recent Labs   Lab Test 04/15/22  1146   A1C 8.5*             Failed - Patient has a recent creatinine (normal) within the past 12 mos.     Recent Labs   Lab Test 11/11/20  0911   CR 0.86       Ok to refill medication if creatinine is low          Failed - Recent (6 mo) or future (30 days) visit within the authorizing provider's specialty     Patient had office visit in the last 6 months or has a visit in the next 30 days with authorizing provider or within the authorizing provider's specialty.  See \"Patient Info\" tab in inbasket, or \"Choose Columns\" in Meds & Orders section of the refill encounter.            Passed - Medication is active on med list        Passed - Patient is age 18 or older             Alisha Hernandez RN 10/09/22 4:45 PM  "

## 2022-11-03 ENCOUNTER — TRANSFERRED RECORDS (OUTPATIENT)
Dept: HEALTH INFORMATION MANAGEMENT | Facility: CLINIC | Age: 57
End: 2022-11-03

## 2022-11-14 DIAGNOSIS — E11.9 TYPE II DIABETES MELLITUS (H): ICD-10-CM

## 2022-11-15 RX ORDER — PIOGLITAZONEHYDROCHLORIDE 30 MG/1
30 TABLET ORAL DAILY
Qty: 90 TABLET | Refills: 0 | Status: SHIPPED | OUTPATIENT
Start: 2022-11-15 | End: 2022-11-22

## 2022-11-15 NOTE — TELEPHONE ENCOUNTER
"Routing refill request to provider for review/approval because:  Labs not current:  Multiple  A break in medication    Last Written Prescription Date:  2/15/22  Last Fill Quantity: 90,  # refills: 0   Last office visit provider:  3/8/22     Requested Prescriptions   Pending Prescriptions Disp Refills     pioglitazone (ACTOS) 30 MG tablet [Pharmacy Med Name: PIOGLITAZONE 30MG TABLETS] 90 tablet 0     Sig: TAKE 1 TABLET(30 MG) BY MOUTH EVERY DAY       Thiazolidinedione Agents (TZDs)  Failed - 11/15/2022 11:20 AM        Failed - Patient has a normal ALT within the past 12 mos.     Recent Labs   Lab Test 11/11/20  0911   ALT 19             Failed - Patient has a normal AST within the past 12 mos.      Recent Labs   Lab Test 11/11/20 0911   AST 14             Failed - Patient has documented A1c within the specified period of time.     If HgbA1C is 8 or greater, it needs to be on file within the past 3 months.  If less than 8, must be on file within the past 6 months.     Recent Labs   Lab Test 04/15/22  1146   A1C 8.5*             Failed - Patient has a normal serum Creatinine in the past 12 months     Recent Labs   Lab Test 11/11/20  0911   CR 0.86       Ok to refill medication if creatinine is low          Passed - Diagnosis not CHF        Passed - Medication is active on med list        Passed - Patient is age 18 or older        Passed - Recent (6 mo) or future (30 days) visit within the authorizing provider's specialty     Patient had office visit in the last 6 months or has a visit in the next 30 days with authorizing provider or within the authorizing provider's specialty.  See \"Patient Info\" tab in inbasket, or \"Choose Columns\" in Meds & Orders section of the refill encounter.                 Isma Gardiner RN 11/15/22 11:20 AM  "

## 2022-11-22 ENCOUNTER — OFFICE VISIT (OUTPATIENT)
Dept: FAMILY MEDICINE | Facility: CLINIC | Age: 57
End: 2022-11-22
Payer: COMMERCIAL

## 2022-11-22 VITALS
DIASTOLIC BLOOD PRESSURE: 80 MMHG | WEIGHT: 297 LBS | HEIGHT: 73 IN | TEMPERATURE: 98.1 F | BODY MASS INDEX: 39.36 KG/M2 | RESPIRATION RATE: 20 BRPM | OXYGEN SATURATION: 98 % | HEART RATE: 75 BPM | SYSTOLIC BLOOD PRESSURE: 131 MMHG

## 2022-11-22 DIAGNOSIS — Z00.00 HEALTHCARE MAINTENANCE: ICD-10-CM

## 2022-11-22 DIAGNOSIS — Z12.5 SCREENING PSA (PROSTATE SPECIFIC ANTIGEN): ICD-10-CM

## 2022-11-22 DIAGNOSIS — E78.5 HYPERLIPIDEMIA, UNSPECIFIED HYPERLIPIDEMIA TYPE: ICD-10-CM

## 2022-11-22 DIAGNOSIS — E11.9 TYPE 2 DIABETES MELLITUS WITHOUT COMPLICATION, WITHOUT LONG-TERM CURRENT USE OF INSULIN (H): ICD-10-CM

## 2022-11-22 DIAGNOSIS — Z12.11 SCREEN FOR COLON CANCER: ICD-10-CM

## 2022-11-22 DIAGNOSIS — I26.99 OTHER PULMONARY EMBOLISM WITHOUT ACUTE COR PULMONALE, UNSPECIFIED CHRONICITY (H): ICD-10-CM

## 2022-11-22 DIAGNOSIS — R19.5 POSITIVE COLORECTAL CANCER SCREENING USING COLOGUARD TEST: Primary | ICD-10-CM

## 2022-11-22 DIAGNOSIS — K21.00 GASTROESOPHAGEAL REFLUX DISEASE WITH ESOPHAGITIS WITHOUT HEMORRHAGE: ICD-10-CM

## 2022-11-22 DIAGNOSIS — Z23 NEED FOR VACCINATION FOR STREP PNEUMONIAE: ICD-10-CM

## 2022-11-22 DIAGNOSIS — I10 ESSENTIAL HYPERTENSION: ICD-10-CM

## 2022-11-22 LAB
ALBUMIN SERPL BCG-MCNC: 4.4 G/DL (ref 3.5–5.2)
ALP SERPL-CCNC: 68 U/L (ref 40–129)
ALT SERPL W P-5'-P-CCNC: 20 U/L (ref 10–50)
ANION GAP SERPL CALCULATED.3IONS-SCNC: 12 MMOL/L (ref 7–15)
AST SERPL W P-5'-P-CCNC: 20 U/L (ref 10–50)
BILIRUB SERPL-MCNC: 0.6 MG/DL
BUN SERPL-MCNC: 18.2 MG/DL (ref 6–20)
CALCIUM SERPL-MCNC: 9.4 MG/DL (ref 8.6–10)
CHLORIDE SERPL-SCNC: 102 MMOL/L (ref 98–107)
CHOLEST SERPL-MCNC: 153 MG/DL
CREAT SERPL-MCNC: 0.81 MG/DL (ref 0.67–1.17)
DEPRECATED HCO3 PLAS-SCNC: 27 MMOL/L (ref 22–29)
ERYTHROCYTE [DISTWIDTH] IN BLOOD BY AUTOMATED COUNT: 13.5 % (ref 10–15)
GFR SERPL CREATININE-BSD FRML MDRD: >90 ML/MIN/1.73M2
GLUCOSE SERPL-MCNC: 134 MG/DL (ref 70–99)
HBA1C MFR BLD: 7 % (ref 0–5.6)
HCT VFR BLD AUTO: 42.1 % (ref 40–53)
HDLC SERPL-MCNC: 72 MG/DL
HGB BLD-MCNC: 13.3 G/DL (ref 13.3–17.7)
HOLD SPECIMEN: NORMAL
LDLC SERPL CALC-MCNC: 64 MG/DL
MCH RBC QN AUTO: 28.7 PG (ref 26.5–33)
MCHC RBC AUTO-ENTMCNC: 31.6 G/DL (ref 31.5–36.5)
MCV RBC AUTO: 91 FL (ref 78–100)
NONHDLC SERPL-MCNC: 81 MG/DL
PLATELET # BLD AUTO: 272 10E3/UL (ref 150–450)
POTASSIUM SERPL-SCNC: 4.3 MMOL/L (ref 3.4–5.3)
PROT SERPL-MCNC: 6.7 G/DL (ref 6.4–8.3)
PSA SERPL-MCNC: 0.6 NG/ML (ref 0–3.5)
RBC # BLD AUTO: 4.64 10E6/UL (ref 4.4–5.9)
SODIUM SERPL-SCNC: 141 MMOL/L (ref 136–145)
TRIGL SERPL-MCNC: 84 MG/DL
WBC # BLD AUTO: 4.9 10E3/UL (ref 4–11)

## 2022-11-22 PROCEDURE — 99396 PREV VISIT EST AGE 40-64: CPT | Mod: 25 | Performed by: FAMILY MEDICINE

## 2022-11-22 PROCEDURE — 80061 LIPID PANEL: CPT | Performed by: FAMILY MEDICINE

## 2022-11-22 PROCEDURE — 36415 COLL VENOUS BLD VENIPUNCTURE: CPT | Performed by: FAMILY MEDICINE

## 2022-11-22 PROCEDURE — G0103 PSA SCREENING: HCPCS | Performed by: FAMILY MEDICINE

## 2022-11-22 PROCEDURE — 80053 COMPREHEN METABOLIC PANEL: CPT | Performed by: FAMILY MEDICINE

## 2022-11-22 PROCEDURE — 90471 IMMUNIZATION ADMIN: CPT | Performed by: FAMILY MEDICINE

## 2022-11-22 PROCEDURE — 85027 COMPLETE CBC AUTOMATED: CPT | Performed by: FAMILY MEDICINE

## 2022-11-22 PROCEDURE — 90677 PCV20 VACCINE IM: CPT | Performed by: FAMILY MEDICINE

## 2022-11-22 PROCEDURE — 83036 HEMOGLOBIN GLYCOSYLATED A1C: CPT | Performed by: FAMILY MEDICINE

## 2022-11-22 PROCEDURE — 99214 OFFICE O/P EST MOD 30 MIN: CPT | Mod: 25 | Performed by: FAMILY MEDICINE

## 2022-11-22 RX ORDER — CHOLECALCIFEROL (VITAMIN D3) 50 MCG
1 TABLET ORAL DAILY
COMMUNITY

## 2022-11-22 RX ORDER — LISINOPRIL 5 MG/1
5 TABLET ORAL DAILY
Qty: 90 TABLET | Refills: 3 | Status: SHIPPED | OUTPATIENT
Start: 2022-11-22 | End: 2023-12-07

## 2022-11-22 RX ORDER — MULTIVIT-MIN/IRON/FOLIC ACID/K 18-600-40
CAPSULE ORAL
COMMUNITY

## 2022-11-22 RX ORDER — PIOGLITAZONEHYDROCHLORIDE 30 MG/1
30 TABLET ORAL DAILY
Qty: 90 TABLET | Refills: 3 | Status: SHIPPED | OUTPATIENT
Start: 2022-11-22 | End: 2023-12-07

## 2022-11-22 RX ORDER — ROSUVASTATIN CALCIUM 20 MG/1
20 TABLET, COATED ORAL DAILY
Qty: 90 TABLET | Refills: 3 | Status: SHIPPED | OUTPATIENT
Start: 2022-11-22 | End: 2023-12-07

## 2022-11-22 RX ORDER — GLIPIZIDE 2.5 MG/1
2.5 TABLET, EXTENDED RELEASE ORAL DAILY
Qty: 90 TABLET | Refills: 3 | Status: SHIPPED | OUTPATIENT
Start: 2022-11-22 | End: 2023-12-07

## 2022-11-22 ASSESSMENT — ENCOUNTER SYMPTOMS
FEVER: 0
COUGH: 0
HEADACHES: 0
SHORTNESS OF BREATH: 0
SORE THROAT: 0
JOINT SWELLING: 0
WEAKNESS: 0
FREQUENCY: 0
ABDOMINAL PAIN: 0
DIZZINESS: 0
PALPITATIONS: 0
MYALGIAS: 0
ARTHRALGIAS: 0
HEMATURIA: 0
CHILLS: 0
HEARTBURN: 0
DIARRHEA: 0
HEMATOCHEZIA: 0
EYE PAIN: 0
PARESTHESIAS: 0
NAUSEA: 0
CONSTIPATION: 0
DYSURIA: 0
NERVOUS/ANXIOUS: 0

## 2022-11-22 ASSESSMENT — ASTHMA QUESTIONNAIRES
QUESTION_4 LAST FOUR WEEKS HOW OFTEN HAVE YOU USED YOUR RESCUE INHALER OR NEBULIZER MEDICATION (SUCH AS ALBUTEROL): NOT AT ALL
QUESTION_5 LAST FOUR WEEKS HOW WOULD YOU RATE YOUR ASTHMA CONTROL: WELL CONTROLLED
ACT_TOTALSCORE: 24
ACT_TOTALSCORE: 24
QUESTION_2 LAST FOUR WEEKS HOW OFTEN HAVE YOU HAD SHORTNESS OF BREATH: NOT AT ALL
QUESTION_1 LAST FOUR WEEKS HOW MUCH OF THE TIME DID YOUR ASTHMA KEEP YOU FROM GETTING AS MUCH DONE AT WORK, SCHOOL OR AT HOME: NONE OF THE TIME
QUESTION_3 LAST FOUR WEEKS HOW OFTEN DID YOUR ASTHMA SYMPTOMS (WHEEZING, COUGHING, SHORTNESS OF BREATH, CHEST TIGHTNESS OR PAIN) WAKE YOU UP AT NIGHT OR EARLIER THAN USUAL IN THE MORNING: NOT AT ALL

## 2022-11-22 NOTE — LETTER
July 7, 2023      Maninder Lacy  [address] [facility] [address]        Dear ,  We are writing to inform you of your test results.    Hi Maninder:  Your Cologuard test was POSITIVE.  It is important that you have a Colonoscopy in the near future to further evaluate this abnormal screening test.    I have placed a referral to [facility] and they should be in touch with you soon.     Resulted Orders   COLOGUARD(EXACT SCIENCES)   Result Value Ref Range    COLOGUARD-ABSTRACT Sample Could Not Be Processed 7 N/A      Comment:      The sample stability limit had been exceeded. The patient will be contacted to initiate a new sample collection.   COLOGUARD(EXACT SCIENCES)   Result Value Ref Range    COLOGUARD-ABSTRACT Positive (A) Negative      Comment:        POSITIVE TEST RESULT. A positive Cologuard result should be followed with a colonoscopy or visual examination of the colon. The normal value (reference range) for this assay is negative.    TEST DESCRIPTION: Composite algorithmic analysis of stool DNA-biomarkers with hemoglobin immunoassay.   Quantitative values of individual biomarkers are not reportable and are not associated with individual biomarker result reference ranges. Cologuard is intended for colorectal cancer screening of adults of either sex, 45 years or older, who are at average-risk for colorectal cancer (CRC). Cologuard has been approved for use by the U.S. FDA. The performance of Cologuard was established in a cross sectional study of average-risk adults aged 50-84. Cologuard performance in patients ages 45 to 49 years was estimated by sub-group analysis of near-age groups. Colonoscopies performed for a positive result may find as the most clinically significant lesion: colorectal cancer [4.0%], advanced adenoma  (including sessile serrated polyps greater than or equal to 1cm diameter) [20%] or non- advanced adenoma [31%]; or no colorectal neoplasia [45%]. These estimates are derived from a  prospective cross-sectional screening study of 10,000 individuals at average risk for colorectal cancer who were screened with both Cologuard and colonoscopy. (Yulia ESTRELLA et al, N Engl J Med 2014;370(14):7784-1535.) Cologuard may produce a false negative or false positive result (no colorectal cancer or precancerous polyp present at colonoscopy follow up). A negative Cologuard test result does not guarantee the absence of CRC or advanced adenoma (pre-cancer). The current Cologuard screening interval is every 3 years. (American Cancer Society and U.S. Multi-Society Task Force). Cologuard performance data in a 10,000 patient pivotal study using colonoscopy as the reference method can be accessed at the following location: www.[employer]/results. Additional description of the Cologuard test process,  warnings and precautions can be found at www.cologuard.com.         If you have any questions or concerns, please call the clinic at the number listed above.       Sincerely,      Isma Stringer MD

## 2022-11-22 NOTE — PATIENT INSTRUCTIONS
Check with insurance on Shingrix coverage    Fasting labs    Cologuard screening    Prevnar 20 today    Med refills

## 2022-11-22 NOTE — PROGRESS NOTES
SUBJECTIVE:     CC: Maninder is an 57 year old who presents for preventative health visit.     Patient has been advised of split billing requirements and indicates understanding: Yes  Healthy Habits:     Getting at least 3 servings of Calcium per day:  Yes    Bi-annual eye exam:  Yes    Dental care twice a year:  Yes    Sleep apnea or symptoms of sleep apnea:  None    Diet:  Regular (no restrictions)    Frequency of exercise:  1 day/week    Duration of exercise:  Other    Taking medications regularly:  Yes    Medication side effects:  None    PHQ-2 Total Score: 2    Additional concerns today:  No    Eye exam 8-12-22:  NORMAL    Today's PHQ-2 Score:   PHQ-2 ( 1999 [employer]) 11/22/2022   Q1: Little interest or pleasure in doing things 1   Q2: Feeling down, depressed or hopeless 1   PHQ-2 Score 2   Q1: Little interest or pleasure in doing things Several days   Q2: Feeling down, depressed or hopeless Several days   PHQ-2 Score 2           Social History     Tobacco Use     Smoking status: Passive Smoke Exposure - Never Smoker     Smokeless tobacco: Never     Tobacco comments:     As a child, parents smoked.   Substance Use Topics     Alcohol use: Not on file     NEVER SMOKER  ALCOHOL: RARE      Alcohol Use 11/22/2022   Prescreen: >3 drinks/day or >7 drinks/week? No       Last PSA:   Prostate Specific Antigen Screen   Date Value Ref Range Status   11/11/2020 0.4 0.0 - 3.5 ng/mL Final         Review of Systems   Constitutional: Negative for chills and fever.   HENT: Negative for congestion, ear pain, hearing loss and sore throat.    Eyes: Negative for pain and visual disturbance.   Respiratory: Negative for cough and shortness of breath.    Cardiovascular: Negative for chest pain, palpitations and peripheral edema.   Gastrointestinal: Negative for abdominal pain, constipation, diarrhea, heartburn, hematochezia and nausea.   Genitourinary: Negative for dysuria, frequency, genital sores, hematuria, impotence, penile discharge and  "urgency.   Musculoskeletal: Negative for arthralgias, joint swelling and myalgias.   Skin: Negative for rash.   Neurological: Negative for dizziness, weakness, headaches and paresthesias.   Psychiatric/Behavioral: Negative for mood changes. The patient is not nervous/anxious.        OBJECTIVE:   /80 (BP Location: Left arm, Patient Position: Sitting, Cuff Size: Adult Large)   Pulse 75   Temp 98.1  F (36.7  C) (Oral)   Resp 20   Ht 1.846 m (6' 0.68\")   Wt 134.7 kg (297 lb)   SpO2 98%   BMI 39.53 kg/m      Physical Exam  GENERAL: healthy, alert and no distress  EYES: Eyes grossly normal to inspection, PERRL and conjunctivae and sclerae normal  HENT: ear canals and TM's normal, nose and mouth without ulcers or lesions  NECK: no adenopathy, no asymmetry, masses, or scars and thyroid normal to palpation  RESP: lungs clear to auscultation - no rales, rhonchi or wheezes  CV: regular rate and rhythm, normal S1 S2, no S3 or S4, no murmur, click or rub, no peripheral edema and peripheral pulses strong  ABDOMEN: soft, nontender, no hepatosplenomegaly, no masses and bowel sounds normal  RECTAL:  Exam declined. Will do yearly PSA  MS: no gross musculoskeletal defects noted, no edema  SKIN: no suspicious lesions or rashes  NEURO: Normal strength and tone, mentation intact and speech normal  PSYCH: mentation appears normal, affect normal/bright    FEET:  MF TESTING: NL              SKIN EXAM:  NL              VASCULAR:   R DP  PULSE: +                                      L DP  PULSE: +                                      R PT  PULSE: +                                      L PT  PULSE: +    Lab Results   Component Value Date    A1C 7.0 11/22/2022    A1C 8.5 04/15/2022    A1C 7.9 11/11/2020    A1C 7.1 12/02/2019    A1C 6.6 04/04/2019         Prostate Specific Antigen Screen   Date Value Ref Range Status   11/11/2020 0.4 0.0 - 3.5 ng/mL Final   NOV 2021                   0.44    ASSESSMENT/PLAN:       ICD-10-CM    1. " Healthcare maintenance  Z00.00 CBC with platelets      2. Essential hypertension, controlled I10 lisinopril (ZESTRIL) 5 MG tablet     Comprehensive metabolic panel (BMP + Alb, Alk Phos, ALT, AST, Total. Bili, TP)      3. Type 2 diabetes mellitus without complication, without long-term current use of insulin (H), controlled E11.9 Albumin Random Urine Quantitative with Creat Ratio     HEMOGLOBIN A1C     glipiZIDE (GLUCOTROL XL) 2.5 MG 24 hr tablet     pioglitazone (ACTOS) 30 MG tablet     Hemoglobin A1c     HEMOGLOBIN A1C      4. Screen for colon cancer, Cologuard ordered Z12.11 COLOGUARD(EXACT SCIENCES)      5. Gastroesophageal reflux disease with esophagitis without hemorrhage, stable on PPI K21.00 omeprazole (PRILOSEC) 20 MG DR capsule      6. Hyperlipidemia, unspecified hyperlipidemia type, stable on statin, check LFTs and lipids E78.5 Lipid panel reflex to direct LDL Non-fasting     rosuvastatin (CRESTOR) 20 MG tablet      7. Other pulmonary embolism without acute cor pulmonale, unspecified chronicity (H), anticoagulated  I26.99 rivaroxaban ANTICOAGULANT (XARELTO ANTICOAGULANT) 10 MG TABS tablet      8. Screening PSA (prostate specific antigen)  Z12.5 PSA, screen      9. Need for vaccination for Strep pneumoniae  Z23 Pneumococcal 20 Valent Conjugate (Prevnar 20)          PLAN:   Check with insurance on Shingrix coverage    Fasting labs    Cologuard screening    Prevnar 20 today    Med refills         Patient has been advised of split billing requirements and indicates understanding: Yes      COUNSELING:   Reviewed preventive health counseling, as reflected in patient instructions       Regular exercise       Healthy diet/nutrition       Colorectal cancer screening       Prostate cancer screening        He reports that he is a non-smoker but has been exposed to tobacco smoke. He has never used smokeless tobacco.        Isma Stringer MD  [facility]

## 2022-11-22 NOTE — LETTER
November 28, 2022      Maninder Lacy  [address] [facility] [address]        Dear ,  We are writing to inform you of your test results.    Hi Maninder:  Your diabetes is under good control  Your PSA is normal with a very small increase from last year.  The PSA should be rechecked in ONE year.  The cholesterol panel is excellent.  The remaining labs are normal.     Resulted Orders   HEMOGLOBIN A1C   Result Value Ref Range    Hemoglobin A1C 7.0 (H) 0.0 - 5.6 %      Comment:      Normal <5.7%   Prediabetes 5.7-6.4%    Diabetes 6.5% or higher     Note: Adopted from ADA consensus guidelines.   Lipid panel reflex to direct LDL Non-fasting   Result Value Ref Range    Cholesterol 153 <200 mg/dL    Triglycerides 84 <150 mg/dL    Direct Measure HDL 72 >=40 mg/dL    LDL Cholesterol Calculated 64 <=100 mg/dL    Non HDL Cholesterol 81 <130 mg/dL    Narrative    Cholesterol  Desirable:  <200 mg/dL    Triglycerides  Normal:  Less than 150 mg/dL  Borderline High:  150-199 mg/dL  High:  200-499 mg/dL  Very High:  Greater than or equal to 500 mg/dL    Direct Measure HDL  Female:  Greater than or equal to 50 mg/dL   Male:  Greater than or equal to 40 mg/dL    LDL Cholesterol  Desirable:  <100mg/dL  Above Desirable:  100-129 mg/dL   Borderline High:  130-159 mg/dL   High:  160-189 mg/dL   Very High:  >= 190 mg/dL    Non HDL Cholesterol  Desirable:  130 mg/dL  Above Desirable:  130-159 mg/dL  Borderline High:  160-189 mg/dL  High:  190-219 mg/dL  Very High:  Greater than or equal to 220 mg/dL   Comprehensive metabolic panel (BMP + Alb, Alk Phos, ALT, AST, Total. Bili, TP)   Result Value Ref Range    Sodium 141 136 - 145 mmol/L    Potassium 4.3 3.4 - 5.3 mmol/L    Chloride 102 98 - 107 mmol/L    Carbon Dioxide (CO2) 27 22 - 29 mmol/L    Anion Gap 12 7 - 15 mmol/L    Urea Nitrogen 18.2 6.0 - 20.0 mg/dL    Creatinine 0.81 0.67 - 1.17 mg/dL    Calcium 9.4 8.6 - 10.0 mg/dL    Glucose 134 (H) 70 - 99 mg/dL    Alkaline  Phosphatase 68 40 - 129 U/L    AST 20 10 - 50 U/L    ALT 20 10 - 50 U/L    Protein Total 6.7 6.4 - 8.3 g/dL    Albumin 4.4 3.5 - 5.2 g/dL    Bilirubin Total 0.6 <=1.2 mg/dL    GFR Estimate >90 >60 mL/min/1.73m2      Comment:      Effective December 21, 2021 eGFRcr in adults is calculated using the 2021 CKD-EPI creatinine equation which includes age and gender (Fanny et al., NE, DOI: 10.1056/TFBVfw5265109)   CBC with platelets   Result Value Ref Range    WBC Count 4.9 4.0 - 11.0 10e3/uL    RBC Count 4.64 4.40 - 5.90 10e6/uL    Hemoglobin 13.3 13.3 - 17.7 g/dL    Hematocrit 42.1 40.0 - 53.0 %    MCV 91 78 - 100 fL    MCH 28.7 26.5 - 33.0 pg    MCHC 31.6 31.5 - 36.5 g/dL    RDW 13.5 10.0 - 15.0 %    Platelet Count 272 150 - 450 10e3/uL   PSA, screen   Result Value Ref Range    Prostate Specific Antigen Screen 0.60 0.00 - 3.50 ng/mL    Narrative    This result is obtained using the Roche Elecsys total PSA method on the javier e801 immunoassay analyzer. Results obtained with different assay methods or kits cannot be used interchangeably.       If you have any questions or concerns, please call the clinic at the number listed above.       Sincerely,      Isma Stringer MD

## 2022-11-22 NOTE — LETTER
April 19, 2023      Maninder Lacy  [address]  [facility] [address]        Dear ,  We are writing to inform you of your test results.    Romulo Dickens:  Your Cologuard could not be processed.   You will be contacted regarding scheduling a new sample collection.    Resulted Orders   COLOGUARD(EXACT SCIENCES)   Result Value Ref Range    COLOGUARD-ABSTRACT Sample Could Not Be Processed 7 N/A      Comment:      The sample stability limit had been exceeded. The patient will be contacted to initiate a new sample collection.       If you have any questions or concerns, please call the clinic at the number listed above.       Sincerely,      Isma Stringer MD

## 2023-04-19 LAB — NONINV COLON CA DNA+OCC BLD SCRN STL QL: NORMAL

## 2023-07-06 LAB — NONINV COLON CA DNA+OCC BLD SCRN STL QL: POSITIVE

## 2023-07-07 ENCOUNTER — TELEPHONE (OUTPATIENT)
Dept: FAMILY MEDICINE | Facility: CLINIC | Age: 58
End: 2023-07-07
Payer: COMMERCIAL

## 2023-07-07 NOTE — TELEPHONE ENCOUNTER
Patient returning call, provider is not currently available. Routing back encounter for a return call again later.  Patient states that he will be leaving soon, and that if he doesn't answer the call to leave him a detailed message.

## 2023-07-07 NOTE — TELEPHONE ENCOUNTER
Let pt know his Cologuard test was POSITIVE.  I am placing a referral for him to have a Colonoscopy in the near future.

## 2023-07-10 ENCOUNTER — TELEPHONE (OUTPATIENT)
Dept: FAMILY MEDICINE | Facility: CLINIC | Age: 58
End: 2023-07-10
Payer: COMMERCIAL

## 2023-07-10 NOTE — TELEPHONE ENCOUNTER
----- Message from Isma Stringer MD sent at 7/7/2023 11:19 AM CDT -----  Romulo Dickens:  Your Cologuard test was POSITIVE.  It is important that you have a Colonoscopy in the near future to further evaluate this abnormal screening test.    I have placed a referral to [facility] and they should be in touch with you soon.

## 2023-10-16 ENCOUNTER — OFFICE VISIT (OUTPATIENT)
Dept: PODIATRY | Facility: CLINIC | Age: 58
End: 2023-10-16
Payer: COMMERCIAL

## 2023-10-16 VITALS — OXYGEN SATURATION: 98 % | DIASTOLIC BLOOD PRESSURE: 85 MMHG | HEART RATE: 78 BPM | SYSTOLIC BLOOD PRESSURE: 150 MMHG

## 2023-10-16 DIAGNOSIS — M24.573 EQUINUS CONTRACTURE OF ANKLE: ICD-10-CM

## 2023-10-16 DIAGNOSIS — M92.62 HAGLUND'S DEFORMITY OF BOTH HEELS: ICD-10-CM

## 2023-10-16 DIAGNOSIS — M72.2 PLANTAR FASCIITIS, BILATERAL: Primary | ICD-10-CM

## 2023-10-16 DIAGNOSIS — M92.61 HAGLUND'S DEFORMITY OF BOTH HEELS: ICD-10-CM

## 2023-10-16 PROCEDURE — 99203 OFFICE O/P NEW LOW 30 MIN: CPT | Performed by: PODIATRIST

## 2023-10-16 RX ORDER — PREDNISONE 10 MG/1
TABLET ORAL
Qty: 30 TABLET | Refills: 0 | Status: SHIPPED | OUTPATIENT
Start: 2023-10-16 | End: 2023-12-05

## 2023-10-16 ASSESSMENT — PAIN SCALES - GENERAL: PAINLEVEL: SEVERE PAIN (6)

## 2023-10-16 NOTE — PROGRESS NOTES
FOOT AND ANKLE SURGERY/PODIATRY Progress Note        ASSESSMENT:   Plantar Fasciitis  Gastrosoleus Equinus   Bhavin's deformity      TREATMENT:  -Patient has pain along the plantar heel at insertion of plantar fascia consistent with plantar fasciitis along with bony prominence in the posterior aspect of the calcaneus consistent with Bhavin's deformity.    -We discussed treatment options to include stretching exercises, anti-inflammatory medication, orthotics, steroid injections, physical therapy and a night splint.     -All questions invited and answered. I will start him on a 12-day tapered course of prednisone and have referred him to [facility] for custom orthotics.     -I have asked him to follow-up after using the orthotics x3-4 weeks if symptoms continue.     Fabian Robles DPM  [facility] Podiatry/Foot & Ankle Surgery      HPI: I was asked to see Maninder Lacy today for pain in the right heel.  Patient reports he has had ongoing discomfort in both feet but greater in the right heel for past several weeks and months.  He admits having increased pain with walking at work and is recently purchased a new boot which she is breaking in.  He does take Tylenol for the pain and is unable to tolerate NSAIDs.    Past Medical History:   Diagnosis Date    Breast injury 04/2016    Pulmonary edema        Past Surgical History:   Procedure Laterality Date    HC KNEE SCOPE, DIAGNOSTIC      Description: Arthroscopy Knee Right;  Recorded: 08/06/2010;  Comments: august, 2010 for medial meniscal tear       Allergies   Allergen Reactions    Metformin Diarrhea    Aspirin Unknown     On Xarelto     Cephalexin Unknown    Theophylline Unknown         Current Outpatient Medications:     albuterol (PROAIR HFA/PROVENTIL HFA/VENTOLIN HFA) 108 (90 Base) MCG/ACT inhaler, Inhale 2 puffs into the lungs every 8 hours as needed, Disp: , Rfl:     Ascorbic Acid (VITAMIN C) 500 MG CAPS, , Disp: , Rfl:     blood sugar diagnostic (ONE  TOUCH ULTRA TEST) Strp, [BLOOD SUGAR DIAGNOSTIC (ONE TOUCH ULTRA TEST) STRP] Use As Directed., Disp: , Rfl:     FASENRA PEN 30 MG/ML SOAJ auto-injector pen, Inject 30 mg Subcutaneous every 2 months, Disp: , Rfl:     fluticasone propion-salmeterol (ADVAIR) 500-50 mcg/dose DISKUS, [FLUTICASONE PROPION-SALMETEROL (ADVAIR) 500-50 MCG/DOSE DISKUS] Inhale 1 puff 2 (two) times a day. Stephanie, Disp: , Rfl:     glipiZIDE (GLUCOTROL XL) 2.5 MG 24 hr tablet, Take 1 tablet (2.5 mg) by mouth daily, Disp: 90 tablet, Rfl: 3    hydrOXYzine pamoate (VISTARIL) 50 MG capsule, [HYDROXYZINE PAMOATE (VISTARIL) 50 MG CAPSULE] Take 1-2 tabs orally at bedtime and may take if needed 1-2 times during the day., Disp: 30 capsule, Rfl: 2    lisinopril (ZESTRIL) 5 MG tablet, Take 1 tablet (5 mg) by mouth daily, Disp: 90 tablet, Rfl: 3    multivitamin capsule, [MULTIVITAMIN CAPSULE] Take 1 capsule by mouth daily., Disp: , Rfl:     omeprazole (PRILOSEC) 20 MG DR capsule, TAKE 1 CAPSULE(20 MG) BY MOUTH DAILY BEFORE BREAKFAST/  Strength: 20 mg, Disp: 90 capsule, Rfl: 3    pioglitazone (ACTOS) 30 MG tablet, Take 1 tablet (30 mg) by mouth daily, Disp: 90 tablet, Rfl: 3    predniSONE (DELTASONE) 10 MG tablet, 40,30,20,10 mg x3 days each, Disp: 30 tablet, Rfl: 0    predniSONE (DELTASONE) 5 MG tablet, Take 5 mg by mouth daily, Disp: , Rfl:     rivaroxaban ANTICOAGULANT (XARELTO ANTICOAGULANT) 10 MG TABS tablet, Take 1 tablet (10 mg) by mouth daily, Disp: 90 tablet, Rfl: 3    rosuvastatin (CRESTOR) 20 MG tablet, Take 1 tablet (20 mg) by mouth daily, Disp: 90 tablet, Rfl: 3    vitamin D3 (CHOLECALCIFEROL) 50 mcg (2000 units) tablet, Take 1 tablet by mouth daily, Disp: , Rfl:     Family History   Problem Relation Age of Onset    Cancer Father         stomach and bladder    Breast Cancer Paternal Aunt        Social History     Socioeconomic History    Marital status:      Spouse name: Not on file    Number of children: Not on file    Years of education:  Not on file    Highest education level: Not on file   Occupational History    Not on file   Tobacco Use    Smoking status: Never     Passive exposure: Yes    Smokeless tobacco: Never    Tobacco comments:     As a child, parents smoked.   Substance and Sexual Activity    Alcohol use: Not on file    Drug use: Not on file    Sexual activity: Not on file   Other Topics Concern    Not on file   Social History Narrative    Not on file     Social Determinants of Health     Financial Resource Strain: Not on file   Food Insecurity: Not on file   Transportation Needs: Not on file   Physical Activity: Not on file   Stress: Not on file   Social Connections: Not on file   Interpersonal Safety: Not on file   Housing Stability: Not on file       Review of Systems - 10 point Review of Systems is negative except for right heel pain which is noted in HPI.    OBJECTIVE:  Appearance: alert, well appearing, and in no distress.    General appearance: Patient is alert and fully cooperative with history & exam.  No sign of distress is noted during the visit.     Psychiatric: Affect is pleasant & appropriate.  Patient appears motivated to improve health.     Respiratory: Breathing is regular & unlabored while sitting.     HEENT: Hearing is intact to spoken word.  Speech is clear.  No gross evidence of visual impairment that would impact ambulation.    Vascular: Dorsalis pedis and posterior tibial pulses are palpable. There is pedal hair growth bilateral.  CFT < 3 sec from anterior tibial surface to distal digits bilateral. There is no appreciable edema noted.  Dermatologic: Turgor and texture are within normal limits. No coloration or temperature changes. No primary or secondary lesions noted.  Neurologic: All epicritic and proprioceptive sensations are grossly intact bilateral.  Musculoskeletal: Minimal pain along the plantar right heel at the insertion of the plantar fascia. Limited ankle dorsiflexion with knee extended and flexed.  Bony  prominence along posterior aspect of bilateral calcaneus at insertion of Achilles tendon consistent with Bhavin's deformity.

## 2023-10-16 NOTE — LETTER
10/16/2023         RE: Maninder Lacy  [address]  [facility] [address]        Dear Colleague,    Thank you for referring your patient, Maninder Lacy, to the [facility] [place]. Please see a copy of my visit note below.    FOOT AND ANKLE SURGERY/PODIATRY Progress Note        ASSESSMENT:   Plantar Fasciitis  Gastrosoleus Equinus   Bhavin's deformity      TREATMENT:  -Patient has pain along the plantar heel at insertion of plantar fascia consistent with plantar fasciitis along with bony prominence in the posterior aspect of the calcaneus consistent with Bhavin's deformity.    -We discussed treatment options to include stretching exercises, anti-inflammatory medication, orthotics, steroid injections, physical therapy and a night splint.     -All questions invited and answered. I will start him on a 12-day tapered course of prednisone and have referred him to [facility] for custom orthotics.     -I have asked him to follow-up after using the orthotics x3-4 weeks if symptoms continue.     Fabian Robles DPM  [facility] Podiatry/Foot & Ankle Surgery      HPI: I was asked to see Maninder Lacy today for pain in the right heel.  Patient reports he has had ongoing discomfort in both feet but greater in the right heel for past several weeks and months.  He admits having increased pain with walking at work and is recently purchased a new boot which she is breaking in.  He does take Tylenol for the pain and is unable to tolerate NSAIDs.    Past Medical History:   Diagnosis Date     Breast injury 04/2016     Pulmonary edema        Past Surgical History:   Procedure Laterality Date     HC KNEE SCOPE, DIAGNOSTIC      Description: Arthroscopy Knee Right;  Recorded: 08/06/2010;  Comments: august, 2010 for medial meniscal tear       Allergies   Allergen Reactions     Metformin Diarrhea     Aspirin Unknown     On Xarelto      Cephalexin Unknown     Theophylline Unknown         Current Outpatient  Medications:      albuterol (PROAIR HFA/PROVENTIL HFA/VENTOLIN HFA) 108 (90 Base) MCG/ACT inhaler, Inhale 2 puffs into the lungs every 8 hours as needed, Disp: , Rfl:      Ascorbic Acid (VITAMIN C) 500 MG CAPS, , Disp: , Rfl:      blood sugar diagnostic (ONE TOUCH ULTRA TEST) Strp, [BLOOD SUGAR DIAGNOSTIC (ONE TOUCH ULTRA TEST) STRP] Use As Directed., Disp: , Rfl:      FASENRA PEN 30 MG/ML SOAJ auto-injector pen, Inject 30 mg Subcutaneous every 2 months, Disp: , Rfl:      fluticasone propion-salmeterol (ADVAIR) 500-50 mcg/dose DISKUS, [FLUTICASONE PROPION-SALMETEROL (ADVAIR) 500-50 MCG/DOSE DISKUS] Inhale 1 puff 2 (two) times a day. [place], Disp: , Rfl:      glipiZIDE (GLUCOTROL XL) 2.5 MG 24 hr tablet, Take 1 tablet (2.5 mg) by mouth daily, Disp: 90 tablet, Rfl: 3     hydrOXYzine pamoate (VISTARIL) 50 MG capsule, [HYDROXYZINE PAMOATE (VISTARIL) 50 MG CAPSULE] Take 1-2 tabs orally at bedtime and may take if needed 1-2 times during the day., Disp: 30 capsule, Rfl: 2     lisinopril (ZESTRIL) 5 MG tablet, Take 1 tablet (5 mg) by mouth daily, Disp: 90 tablet, Rfl: 3     multivitamin capsule, [MULTIVITAMIN CAPSULE] Take 1 capsule by mouth daily., Disp: , Rfl:      omeprazole (PRILOSEC) 20 MG DR capsule, TAKE 1 CAPSULE(20 MG) BY MOUTH DAILY BEFORE BREAKFAST/  Strength: 20 mg, Disp: 90 capsule, Rfl: 3     pioglitazone (ACTOS) 30 MG tablet, Take 1 tablet (30 mg) by mouth daily, Disp: 90 tablet, Rfl: 3     predniSONE (DELTASONE) 10 MG tablet, 40,30,20,10 mg x3 days each, Disp: 30 tablet, Rfl: 0     predniSONE (DELTASONE) 5 MG tablet, Take 5 mg by mouth daily, Disp: , Rfl:      rivaroxaban ANTICOAGULANT (XARELTO ANTICOAGULANT) 10 MG TABS tablet, Take 1 tablet (10 mg) by mouth daily, Disp: 90 tablet, Rfl: 3     rosuvastatin (CRESTOR) 20 MG tablet, Take 1 tablet (20 mg) by mouth daily, Disp: 90 tablet, Rfl: 3     vitamin D3 (CHOLECALCIFEROL) 50 mcg (2000 units) tablet, Take 1 tablet by mouth daily, Disp: , Rfl:     Family  History   Problem Relation Age of Onset     Cancer Father         stomach and bladder     Breast Cancer Paternal Aunt        Social History     Socioeconomic History     Marital status:      Spouse name: Not on file     Number of children: Not on file     Years of education: Not on file     Highest education level: Not on file   Occupational History     Not on file   Tobacco Use     Smoking status: Never     Passive exposure: Yes     Smokeless tobacco: Never     Tobacco comments:     As a child, parents smoked.   Substance and Sexual Activity     Alcohol use: Not on file     Drug use: Not on file     Sexual activity: Not on file   Other Topics Concern     Not on file   Social History Narrative     Not on file     Social Determinants of Health     Financial Resource Strain: Not on file   Food Insecurity: Not on file   Transportation Needs: Not on file   Physical Activity: Not on file   Stress: Not on file   Social Connections: Not on file   Interpersonal Safety: Not on file   Housing Stability: Not on file       Review of Systems - 10 point Review of Systems is negative except for right heel pain which is noted in HPI.    OBJECTIVE:  Appearance: alert, well appearing, and in no distress.    General appearance: Patient is alert and fully cooperative with history & exam.  No sign of distress is noted during the visit.     Psychiatric: Affect is pleasant & appropriate.  Patient appears motivated to improve health.     Respiratory: Breathing is regular & unlabored while sitting.     HEENT: Hearing is intact to spoken word.  Speech is clear.  No gross evidence of visual impairment that would impact ambulation.    Vascular: Dorsalis pedis and posterior tibial pulses are palpable. There is pedal hair growth bilateral.  CFT < 3 sec from anterior tibial surface to distal digits bilateral. There is no appreciable edema noted.  Dermatologic: Turgor and texture are within normal limits. No coloration or temperature  changes. No primary or secondary lesions noted.  Neurologic: All epicritic and proprioceptive sensations are grossly intact bilateral.  Musculoskeletal: Minimal pain along the plantar right heel at the insertion of the plantar fascia. Limited ankle dorsiflexion with knee extended and flexed.  Bony prominence along posterior aspect of bilateral calcaneus at insertion of Achilles tendon consistent with Bhavin's deformity.          Again, thank you for allowing me to participate in the care of your patient.        Sincerely,        Fabian Robles DPM

## 2023-10-16 NOTE — PATIENT INSTRUCTIONS
What is Plantar Fasciitis?  Plantar Fasciitis also referred to as  heel pain syndrome  is the most common cause cited for pain in heels. Plantar Fasciitis is the pain and inflammation at the point where the flat band of tissue called the plantar fascia connects the heel bone to the toes. Plantar fascia maintains the arch of the foot. Applying pressure on it will make it swollen, weak, and irritated. This will make the heel of your foot to ache when you walk or stand for a prolonged period.    Symptoms  Most people suffering from Plantar Fasciitis experience pain when they walk after resting their feet for a long duration. You may experience less pain and stiffness after a few steps. But your foot may hurt more as the day goes on. It may hurt the most when you climb stairs or after you stand for a long time. Continuous foot pain at night might be due to different problems like arthritis or nerve problems.    Causes  Straining the ligament which supports the arch can cause Plantar Fasciitis. Repeated straining can cause tiny tears in the ligament which lead to pain and swelling. Plantar Fasciitis is very evident in cases of:  Tight Achilles tendon or calf muscles   Running long distances, especially on hard & uneven surfaces   Problems of the foot arch   Sudden obesity   Wearing shoes with soft soles or poor arch support   In-toeing              PRO STRETCH    [facility]  [facility]  [address]  [place] [address]  Phone: [phone]  Fax: [phone] [facility]  [address] [place] [address]  Home Medical Equipment, [address] Phone: [phone]  Orthotics and Prosthetics, [address]  Phone [phone] [facility]  [address] [place] [address] [address]  [place] [address]  Phone: [phone]   Fax: [phone]   [facility]  [address]  [place] [address]  Phone: [phone]  Fax: [phone] [facility]   Home Medical Equipment   [address], [place] [address]  Phone :[phone]  Orthotics and Prosthetics  [address], [facility] [address]  Phone:[phone]   [facility]  [address]  [place] [address]  Phone: [phone]   Fax: [phone]   [facility]   [address]  [place] [address]  Phone: [phone]  Fax: [phone] [facility]   [address]  [address]  [place] [address]  Phone: [phone] [place]  [address] [place] [address]  Phone :[phone]             WEARING YOUR CUSTOM FOOT ORTHOTICS   Most insurance plans cover one pair of orthotics per year. You must check with your   insurance plan to see what your payment responsibility will be. Please call your   insurance company by calling the number on the back of your insurance card.   Orthotic's are non-refundable and non-returnable.   Orthotics are made of various designs. Some orthotics are covered with material that extends beyond your toes. If your orthotic is of this design, you will likely need to trim the toe end to get a proper fit. The insole from your shoe can be used as a template. Simply overlay the shoe insert on top of the custom orthotic. Align the heel end while tracing the length of the insert onto the custom orthotic. Use a large scissor to trim the toe end until you get a proper fit in the shoe.   The orthotic needs to be pushed as far back in the shoe as possible. The heel portion should not ride forward so as not to irritate your heel.   Orthotics are designed to work with socks. Excessive perspiration will shorten the life span of the orthotics. Remove the orthotic from the shoe frequently for proper drying.   The break-in period lasts for weeks. People new to orthotics will likely  experience new aches and pains. The orthotic is forcing your foot into a new position. Arch, foot and leg muscle aches and fatigue are common during these weeks. Minor discomfort can be considered normal break in phenomenon. Start wearing your orthotic around your home your first day. Limited activity for one to two hours is recommended. You can increase one or two additional hours each day provided the aches and pains are subsiding. The degree of discomfort, fatigue and problems will dictate the speed of break in. You may require multiple weeks to work up to full time use.   Do not continue wearing your orthotics if they are creating problems such as blisters or sores. Do not hesitate to call the clinic to speak with a nurse regarding orthotic   break in, fit, trimming, etc. You may also need to see the doctor if the orthotics are   simply not working out. Adjustments are sometimes made to improve orthotic   function.     Orthotics will only work in certain styles and types of shoes. Orthotics rarely work in dress shoes. Slip-ons, clogs, sandals and heels are particularly troublesome. Specially designed orthotics may be necessary for these types of shoes. Your custom orthotic was designed for activities that require appropriate walking or running shoes. Lace up athletic shoes, walking shoes or work boots should work appropriately. You may need a wider or longer shoe. Shoes with a removable  or insert work best. In general, you want to remove an insert from the shoe before placing the orthotic into the shoe. Shoes without a removable liner may not work as well.     When purchasing new shoes, bring your orthotics along to get a proper fit. Shop at stores that are familiar with orthotics.   Frequent washing of the orthotic may shorten the life span of the top cover. The top cover can be replaced but will generally last one to five years depending on use and foot perspiration.

## 2023-11-16 ENCOUNTER — TRANSFERRED RECORDS (OUTPATIENT)
Dept: HEALTH INFORMATION MANAGEMENT | Facility: CLINIC | Age: 58
End: 2023-11-16
Payer: COMMERCIAL

## 2023-12-05 ENCOUNTER — OFFICE VISIT (OUTPATIENT)
Dept: FAMILY MEDICINE | Facility: CLINIC | Age: 58
End: 2023-12-05
Payer: COMMERCIAL

## 2023-12-05 VITALS
TEMPERATURE: 98.2 F | HEART RATE: 82 BPM | BODY MASS INDEX: 40.42 KG/M2 | RESPIRATION RATE: 20 BRPM | DIASTOLIC BLOOD PRESSURE: 72 MMHG | OXYGEN SATURATION: 95 % | SYSTOLIC BLOOD PRESSURE: 128 MMHG | WEIGHT: 305 LBS | HEIGHT: 73 IN

## 2023-12-05 DIAGNOSIS — E66.01 CLASS 3 SEVERE OBESITY DUE TO EXCESS CALORIES WITH SERIOUS COMORBIDITY AND BODY MASS INDEX (BMI) OF 40.0 TO 44.9 IN ADULT (H): ICD-10-CM

## 2023-12-05 DIAGNOSIS — Z12.5 SCREENING PSA (PROSTATE SPECIFIC ANTIGEN): ICD-10-CM

## 2023-12-05 DIAGNOSIS — J45.40 MODERATE PERSISTENT ASTHMA WITHOUT COMPLICATION: ICD-10-CM

## 2023-12-05 DIAGNOSIS — I10 ESSENTIAL HYPERTENSION: ICD-10-CM

## 2023-12-05 DIAGNOSIS — R19.5 POSITIVE COLORECTAL CANCER SCREENING USING COLOGUARD TEST: ICD-10-CM

## 2023-12-05 DIAGNOSIS — Z00.00 HEALTHCARE MAINTENANCE: ICD-10-CM

## 2023-12-05 DIAGNOSIS — E11.9 TYPE 2 DIABETES MELLITUS WITHOUT COMPLICATION, WITHOUT LONG-TERM CURRENT USE OF INSULIN (H): ICD-10-CM

## 2023-12-05 DIAGNOSIS — E66.813 CLASS 3 SEVERE OBESITY DUE TO EXCESS CALORIES WITH SERIOUS COMORBIDITY AND BODY MASS INDEX (BMI) OF 40.0 TO 44.9 IN ADULT (H): ICD-10-CM

## 2023-12-05 LAB
ALBUMIN SERPL BCG-MCNC: 4.6 G/DL (ref 3.5–5.2)
ALP SERPL-CCNC: 82 U/L (ref 40–150)
ALT SERPL W P-5'-P-CCNC: 22 U/L (ref 0–70)
ANION GAP SERPL CALCULATED.3IONS-SCNC: 11 MMOL/L (ref 7–15)
AST SERPL W P-5'-P-CCNC: 17 U/L (ref 0–45)
BILIRUB SERPL-MCNC: 0.7 MG/DL
BUN SERPL-MCNC: 15 MG/DL (ref 6–20)
CALCIUM SERPL-MCNC: 9.6 MG/DL (ref 8.6–10)
CHLORIDE SERPL-SCNC: 102 MMOL/L (ref 98–107)
CHOLEST SERPL-MCNC: 153 MG/DL
CREAT SERPL-MCNC: 0.8 MG/DL (ref 0.67–1.17)
CREAT UR-MCNC: 184 MG/DL
DEPRECATED HCO3 PLAS-SCNC: 27 MMOL/L (ref 22–29)
EGFRCR SERPLBLD CKD-EPI 2021: >90 ML/MIN/1.73M2
ERYTHROCYTE [DISTWIDTH] IN BLOOD BY AUTOMATED COUNT: 13.2 % (ref 10–15)
GLUCOSE SERPL-MCNC: 139 MG/DL (ref 70–99)
HBA1C MFR BLD: 8 % (ref 0–5.6)
HCT VFR BLD AUTO: 44 % (ref 40–53)
HDLC SERPL-MCNC: 66 MG/DL
HGB BLD-MCNC: 14.2 G/DL (ref 13.3–17.7)
LDLC SERPL CALC-MCNC: 68 MG/DL
MCH RBC QN AUTO: 28.7 PG (ref 26.5–33)
MCHC RBC AUTO-ENTMCNC: 32.3 G/DL (ref 31.5–36.5)
MCV RBC AUTO: 89 FL (ref 78–100)
MICROALBUMIN UR-MCNC: <12 MG/L
MICROALBUMIN/CREAT UR: NORMAL MG/G{CREAT}
NONHDLC SERPL-MCNC: 87 MG/DL
PLATELET # BLD AUTO: 269 10E3/UL (ref 150–450)
POTASSIUM SERPL-SCNC: 4 MMOL/L (ref 3.4–5.3)
PROT SERPL-MCNC: 7 G/DL (ref 6.4–8.3)
PSA SERPL DL<=0.01 NG/ML-MCNC: 0.65 NG/ML (ref 0–3.5)
RBC # BLD AUTO: 4.94 10E6/UL (ref 4.4–5.9)
SODIUM SERPL-SCNC: 140 MMOL/L (ref 135–145)
TRIGL SERPL-MCNC: 96 MG/DL
WBC # BLD AUTO: 4.9 10E3/UL (ref 4–11)

## 2023-12-05 PROCEDURE — 80061 LIPID PANEL: CPT | Performed by: FAMILY MEDICINE

## 2023-12-05 PROCEDURE — 99214 OFFICE O/P EST MOD 30 MIN: CPT | Mod: 25 | Performed by: FAMILY MEDICINE

## 2023-12-05 PROCEDURE — 83036 HEMOGLOBIN GLYCOSYLATED A1C: CPT | Performed by: FAMILY MEDICINE

## 2023-12-05 PROCEDURE — 82043 UR ALBUMIN QUANTITATIVE: CPT | Performed by: FAMILY MEDICINE

## 2023-12-05 PROCEDURE — 36415 COLL VENOUS BLD VENIPUNCTURE: CPT | Performed by: FAMILY MEDICINE

## 2023-12-05 PROCEDURE — 80053 COMPREHEN METABOLIC PANEL: CPT | Performed by: FAMILY MEDICINE

## 2023-12-05 PROCEDURE — G0103 PSA SCREENING: HCPCS | Performed by: FAMILY MEDICINE

## 2023-12-05 PROCEDURE — 85027 COMPLETE CBC AUTOMATED: CPT | Performed by: FAMILY MEDICINE

## 2023-12-05 PROCEDURE — 82570 ASSAY OF URINE CREATININE: CPT | Performed by: FAMILY MEDICINE

## 2023-12-05 PROCEDURE — 99396 PREV VISIT EST AGE 40-64: CPT | Performed by: FAMILY MEDICINE

## 2023-12-05 ASSESSMENT — ENCOUNTER SYMPTOMS
HEADACHES: 0
EYE PAIN: 0
SHORTNESS OF BREATH: 0
NAUSEA: 0
SORE THROAT: 0
MYALGIAS: 0
DIZZINESS: 0
DIARRHEA: 0
CHILLS: 0
WEAKNESS: 0
ARTHRALGIAS: 1
HEMATURIA: 0
JOINT SWELLING: 0
PALPITATIONS: 0
PARESTHESIAS: 0
NERVOUS/ANXIOUS: 0
COUGH: 0
FREQUENCY: 0
DYSURIA: 0
ABDOMINAL PAIN: 0
HEARTBURN: 0
CONSTIPATION: 0
HEMATOCHEZIA: 0
FEVER: 0

## 2023-12-05 ASSESSMENT — ASTHMA QUESTIONNAIRES: ACT_TOTALSCORE: 24

## 2023-12-05 NOTE — PROGRESS NOTES
SUBJECTIVE:   Maninder is a 58 year old, presenting for the following:  Physical (fasting)        12/5/2023     8:25 AM   Additional Questions   Roomed by DEANDRA Davis CMA   Accompanied by -       Healthy Habits:     Getting at least 3 servings of Calcium per day:  Yes    Bi-annual eye exam:  Yes    Dental care twice a year:  Yes    Sleep apnea or symptoms of sleep apnea:  None    Diet:  Regular (no restrictions)    Frequency of exercise:  None    Taking medications regularly:  Yes    Medication side effects:  None    Additional concerns today:  No      Today's PHQ-2 Score:       12/5/2023     8:22 AM   PHQ-2 ( 1999 [employer])   Q1: Little interest or pleasure in doing things 1   Q2: Feeling down, depressed or hopeless 1   PHQ-2 Score 2   Q1: Little interest or pleasure in doing things Several days   Q2: Feeling down, depressed or hopeless Several days   PHQ-2 Score 2           12/3/2020    10:51 AM 11/22/2022     7:11 AM 12/5/2023     8:23 AM   ACT Total Scores   ACT TOTAL SCORE (Goal Greater than or Equal to 20) 23 24 24   In the past 12 months, how many times did you visit the emergency room for your asthma without being admitted to the hospital? 0 0 0   In the past 12 months, how many times were you hospitalized overnight because of your asthma? 0 0 0        Social History     Tobacco Use    Smoking status: Never     Passive exposure: Yes    Smokeless tobacco: Never    Tobacco comments:     As a child, parents smoked.   Substance Use Topics    Alcohol use: Not on file     Alcohol:  NONE          12/5/2023     8:22 AM   Alcohol Use   Prescreen: >3 drinks/day or >7 drinks/week? Not Applicable       Last PSA:   Prostate Specific Antigen Screen   Date Value Ref Range Status   11/22/2022 0.60 0.00 - 3.50 ng/mL Final   11/11/2020 0.4 0.0 - 3.5 ng/mL Final       Reviewed orders with patient. Reviewed health maintenance and updated orders accordingly - Yes      Reviewed and updated as needed this visit by clinical staff    "Tobacco  Allergies  Meds              Reviewed and updated as needed this visit by Provider                     Review of Systems   Constitutional:  Negative for chills and fever.   HENT:  Positive for congestion and hearing loss. Negative for ear pain and sore throat.    Eyes:  Negative for pain and visual disturbance.   Respiratory:  Negative for cough and shortness of breath.    Cardiovascular:  Negative for chest pain, palpitations and peripheral edema.   Gastrointestinal:  Negative for abdominal pain, constipation, diarrhea, heartburn, hematochezia and nausea.   Genitourinary:  Negative for dysuria, frequency, genital sores, hematuria, impotence, penile discharge and urgency.   Musculoskeletal:  Positive for arthralgias. Negative for joint swelling and myalgias.   Skin:  Negative for rash.   Neurological:  Negative for dizziness, weakness, headaches and paresthesias.   Psychiatric/Behavioral:  Negative for mood changes. The patient is not nervous/anxious.    Occas transitory pains under the left breast not related to exertion.  No chest pain with stairs or raking.       OBJECTIVE:   /72   Pulse 82   Temp 98.2  F (36.8  C)   Resp 20   Ht 1.848 m (6' 0.75\")   Wt 138.3 kg (305 lb)   SpO2 95%   BMI 40.52 kg/m      Wt Readings from Last 4 Encounters:   12/05/23 138.3 kg (305 lb)   11/22/22 134.7 kg (297 lb)   03/08/22 130.2 kg (287 lb)   01/20/21 139.5 kg (307 lb 9.6 oz)        Physical Exam  GENERAL: healthy, alert and no distress  EYES: Eyes grossly normal to inspection, PERRL and conjunctivae and sclerae normal  HENT: ear canals and TM's normal, nose and mouth without ulcers or lesions  NECK: no adenopathy, no asymmetry, masses, or scars and thyroid normal to palpation  RESP: lungs clear to auscultation - no rales, rhonchi or wheezes  CV: regular rate and rhythm, normal S1 S2, no S3 or S4, no murmur, click or rub, no peripheral edema and peripheral pulses strong  ABDOMEN: soft, nontender, no " hepatosplenomegaly, no masses and bowel sounds normal  RECTAL:   EXAM DECLINED.  Will check yearly PSA  MS: no gross musculoskeletal defects noted, no edema  SKIN: no suspicious lesions or rashes  NEURO: Normal strength and tone, mentation intact and speech normal  PSYCH: mentation appears normal, affect normal/bright    FEET:  MF TESTING: NL              SKIN EXAM:  NL              VASCULAR:   R DP  PULSE: +                                      L DP  PULSE: +                                      R PT  PULSE: +                                      L PT  PULSE: +    Lab Results   Component Value Date    A1C 8.0 12/05/2023    A1C 7.0 11/22/2022    A1C 8.5 04/15/2022    A1C 7.9 11/11/2020    A1C 7.1 12/02/2019       Prostate Specific Antigen Screen   Date Value Ref Range Status   11/22/2022 0.60 0.00 - 3.50 ng/mL Final   11/11/2020 0.4 0.0 - 3.5 ng/mL Final     Lab Results   Component Value Date    A1C 7.0 11/22/2022    A1C 8.5 04/15/2022    A1C 7.9 11/11/2020    A1C 7.1 12/02/2019    A1C 6.6 04/04/2019         ASSESSMENT/PLAN:       ICD-10-CM    1. Healthcare maintenance  Z00.00 REVIEW OF HEALTH MAINTENANCE PROTOCOL ORDERS     CBC with platelets      2. Essential hypertension, controlled I10 Comprehensive metabolic panel (BMP + Alb, Alk Phos, ALT, AST, Total. Bili, TP)      3. Moderate persistent asthma without complication, stable, well controlled J45.40       4. Type 2 diabetes mellitus without complication, without long-term current use of insulin (H),fair control but not at goal E11.9 Albumin Random Urine Quantitative with Creat Ratio     HEMOGLOBIN A1C     Lipid panel reflex to direct LDL Fasting      5. Screening PSA (prostate specific antigen)  Z12.5 PSA, screen      6. Positive colorectal cancer screening using Cologuard test, schedule Colonoscopy ASAP. R19.5         PLAN:   Fasting labs    Check with insurance on Shingrix coverage    Please schedule your Colonoscopy in the near future by calling [facility]  (POSITIVE COLOGUARD IN JULY 2023)    Schedule your eye exam.    Work on weight reduction of 20-30 lbs       Patient has been advised of split billing requirements and indicates understanding: Yes      COUNSELING:   Reviewed preventive health counseling, as reflected in patient instructions       Regular exercise       Healthy diet/nutrition        Colorectal cancer screening       Prostate cancer screening        He reports that he has never smoked. He has been exposed to tobacco smoke. He has never used smokeless tobacco.        Isma Stringer MD  [facility]

## 2023-12-05 NOTE — LETTER
December 6, 2023      Maninder BOURNE Bambi  [address]  [facility] [address]        Dear ,  We are writing to inform you of your test results.    Romulo Dickens:  Your PSA is normal and stable;  recheck the PSA in one year.  Your A1C not at goal of < 7.  We should consider an increase in your glipizide or  starting Trulicity (a new class of meds called the GLP-1's which would require a weekly injection).  Let me know which option you prefer.  If you wish to talk further about this decision let me know as well and we could do that over the phone.    Your cholesterol panel is excellent and the remaining labs are normal.     Resulted Orders   Albumin Random Urine Quantitative with Creat Ratio   Result Value Ref Range    Creatinine Urine mg/dL 184.0 mg/dL      Comment:      The reference ranges have not been established in urine creatinine. The results should be integrated into the clinical context for interpretation.    Albumin Urine mg/L <12.0 mg/L      Comment:      The reference ranges have not been established in urine albumin. The results should be integrated into the clinical context for interpretation.    Albumin Urine mg/g Cr        Comment:      Unable to calculate, urine albumin and/or urine creatinine is outside detectable limits.  Microalbuminuria is defined as an albumin:creatinine ratio of 17 to 299 for males and 25 to 299 for females. A ratio of albumin:creatinine of 300 or higher is indicative of overt proteinuria.  Due to biologic variability, positive results should be confirmed by a second, first-morning random or 24-hour timed urine specimen. If there is discrepancy, a third specimen is recommended. When 2 out of 3 results are in the microalbuminuria range, this is evidence for incipient nephropathy and warrants increased efforts at glucose control, blood pressure control, and institution of therapy with an angiotensin-converting-enzyme (ACE) inhibitor (if the patient can tolerate it).      HEMOGLOBIN A1C   Result Value Ref Range    Hemoglobin A1C 8.0 (H) 0.0 - 5.6 %      Comment:      Normal <5.7%   Prediabetes 5.7-6.4%    Diabetes 6.5% or higher     Note: Adopted from ADA consensus guidelines.   Lipid panel reflex to direct LDL Fasting   Result Value Ref Range    Cholesterol 153 <200 mg/dL    Triglycerides 96 <150 mg/dL    Direct Measure HDL 66 >=40 mg/dL    LDL Cholesterol Calculated 68 <=100 mg/dL    Non HDL Cholesterol 87 <130 mg/dL    Narrative    Cholesterol  Desirable:  <200 mg/dL    Triglycerides  Normal:  Less than 150 mg/dL  Borderline High:  150-199 mg/dL  High:  200-499 mg/dL  Very High:  Greater than or equal to 500 mg/dL    Direct Measure HDL  Female:  Greater than or equal to 50 mg/dL   Male:  Greater than or equal to 40 mg/dL    LDL Cholesterol  Desirable:  <100mg/dL  Above Desirable:  100-129 mg/dL   Borderline High:  130-159 mg/dL   High:  160-189 mg/dL   Very High:  >= 190 mg/dL    Non HDL Cholesterol  Desirable:  130 mg/dL  Above Desirable:  130-159 mg/dL  Borderline High:  160-189 mg/dL  High:  190-219 mg/dL  Very High:  Greater than or equal to 220 mg/dL   Comprehensive metabolic panel (BMP + Alb, Alk Phos, ALT, AST, Total. Bili, TP)   Result Value Ref Range    Sodium 140 135 - 145 mmol/L      Comment:      Reference intervals for this test were updated on 09/26/2023 to more accurately reflect our healthy population. There may be differences in the flagging of prior results with similar values performed with this method. Interpretation of those prior results can be made in the context of the updated reference intervals.     Potassium 4.0 3.4 - 5.3 mmol/L    Carbon Dioxide (CO2) 27 22 - 29 mmol/L    Anion Gap 11 7 - 15 mmol/L    Urea Nitrogen 15.0 6.0 - 20.0 mg/dL    Creatinine 0.80 0.67 - 1.17 mg/dL    GFR Estimate >90 >60 mL/min/1.73m2    Calcium 9.6 8.6 - 10.0 mg/dL    Chloride 102 98 - 107 mmol/L    Glucose 139 (H) 70 - 99 mg/dL    Alkaline Phosphatase 82 40 - 150 U/L       Comment:      Reference intervals for this test were updated on 11/14/2023 to more accurately reflect our healthy population. There may be differences in the flagging of prior results with similar values performed with this method. Interpretation of those prior results can be made in the context of the updated reference intervals.    AST 17 0 - 45 U/L      Comment:      Reference intervals for this test were updated on 6/12/2023 to more accurately reflect our healthy population. There may be differences in the flagging of prior results with similar values performed with this method. Interpretation of those prior results can be made in the context of the updated reference intervals.    ALT 22 0 - 70 U/L      Comment:      Reference intervals for this test were updated on 6/12/2023 to more accurately reflect our healthy population. There may be differences in the flagging of prior results with similar values performed with this method. Interpretation of those prior results can be made in the context of the updated reference intervals.      Protein Total 7.0 6.4 - 8.3 g/dL    Albumin 4.6 3.5 - 5.2 g/dL    Bilirubin Total 0.7 <=1.2 mg/dL   PSA, screen   Result Value Ref Range    Prostate Specific Antigen Screen 0.65 0.00 - 3.50 ng/mL    Narrative    This result is obtained using the Roche Elecsys total PSA method on the javier e801 immunoassay analyzer. Results obtained with different assay methods or kits cannot be used interchangeably.   CBC with platelets   Result Value Ref Range    WBC Count 4.9 4.0 - 11.0 10e3/uL    RBC Count 4.94 4.40 - 5.90 10e6/uL    Hemoglobin 14.2 13.3 - 17.7 g/dL    Hematocrit 44.0 40.0 - 53.0 %    MCV 89 78 - 100 fL    MCH 28.7 26.5 - 33.0 pg    MCHC 32.3 31.5 - 36.5 g/dL    RDW 13.2 10.0 - 15.0 %    Platelet Count 269 150 - 450 10e3/uL       If you have any questions or concerns, please call the clinic at the number listed above.       Sincerely,      Isma Stringer MD

## 2023-12-05 NOTE — PATIENT INSTRUCTIONS
Fasting labs    Check with insurance on Shingrix coverage    Please schedule your Colonoscopy in the near future by calling [facility] (POSITIVE COLOGUARD IN JULY 2023)    Schedule your eye exam.    Work on weight reduction of 20-30 lbs

## 2023-12-07 DIAGNOSIS — I26.99 OTHER PULMONARY EMBOLISM WITHOUT ACUTE COR PULMONALE, UNSPECIFIED CHRONICITY (H): ICD-10-CM

## 2023-12-07 DIAGNOSIS — E11.9 TYPE 2 DIABETES MELLITUS WITHOUT COMPLICATION, WITHOUT LONG-TERM CURRENT USE OF INSULIN (H): ICD-10-CM

## 2023-12-07 DIAGNOSIS — I10 ESSENTIAL HYPERTENSION: ICD-10-CM

## 2023-12-07 DIAGNOSIS — K21.00 GASTROESOPHAGEAL REFLUX DISEASE WITH ESOPHAGITIS WITHOUT HEMORRHAGE: ICD-10-CM

## 2023-12-07 DIAGNOSIS — E78.5 HYPERLIPIDEMIA, UNSPECIFIED HYPERLIPIDEMIA TYPE: ICD-10-CM

## 2023-12-07 RX ORDER — GLIPIZIDE 2.5 MG/1
2.5 TABLET, EXTENDED RELEASE ORAL DAILY
Qty: 90 TABLET | Refills: 3 | Status: SHIPPED | OUTPATIENT
Start: 2023-12-07 | End: 2023-12-21

## 2023-12-07 RX ORDER — LISINOPRIL 5 MG/1
5 TABLET ORAL DAILY
Qty: 90 TABLET | Refills: 3 | Status: SHIPPED | OUTPATIENT
Start: 2023-12-07

## 2023-12-07 RX ORDER — ROSUVASTATIN CALCIUM 20 MG/1
20 TABLET, COATED ORAL DAILY
Qty: 90 TABLET | Refills: 3 | Status: SHIPPED | OUTPATIENT
Start: 2023-12-07

## 2023-12-07 RX ORDER — PIOGLITAZONEHYDROCHLORIDE 30 MG/1
30 TABLET ORAL DAILY
Qty: 90 TABLET | Refills: 3 | Status: SHIPPED | OUTPATIENT
Start: 2023-12-07

## 2023-12-07 NOTE — TELEPHONE ENCOUNTER
Patient Returning Call    Reason for call:  Returned the clinic's call     Information relayed to patient:  Read Dr. Stringer's message about the lab results, patent said he wants to increase the glipizide. Wants lab result mail to his home address.     Patient has additional questions:  No      Okay to leave a detailed message?: Yes at Home number on file [phone] (home) or Cell number on file:    No relevant phone numbers on file.

## 2023-12-07 NOTE — TELEPHONE ENCOUNTER
----- Message from Isma Stringer MD sent at 12/6/2023  9:02 AM CST -----  Romulo Dickens:  Your PSA is normal and stable;  recheck the PSA in one year.    Your A1C not at goal of < 7.  We should consider an increase in your glipizide or  starting Trulicity (a new class of meds called the GLP-1's which would require a weekly injection).  Let me know which option you prefer.  If you wish to talk further about this decision let me know as well and we could do that over the phone.    Your cholesterol panel is excellent and the remaining labs are normal.

## 2023-12-14 NOTE — TELEPHONE ENCOUNTER
Patient calling back again regarding the status of the request to increase his glipizide. He states that he went to his pharmacy to  his new rx and they still only have the rx for the old dosage not the new increase one. Please advise.    Erin #[id number]    GORGE okay.

## 2023-12-21 RX ORDER — GLIPIZIDE 2.5 MG/1
2.5 TABLET, EXTENDED RELEASE ORAL DAILY
Qty: 90 TABLET | Refills: 3 | Status: SHIPPED | OUTPATIENT
Start: 2023-12-21 | End: 2024-01-29

## 2023-12-21 NOTE — TELEPHONE ENCOUNTER
Patient called back and stated that he is still waiting on the Glipizide to be increased to 3.0MG instead of the 2.5MG. please send in new prescription to the pharmacy attached.

## 2023-12-23 ENCOUNTER — TRANSFERRED RECORDS (OUTPATIENT)
Dept: MULTI SPECIALTY CLINIC | Facility: CLINIC | Age: 58
End: 2023-12-23

## 2023-12-23 LAB — RETINOPATHY: NORMAL

## 2024-01-29 ENCOUNTER — OFFICE VISIT (OUTPATIENT)
Dept: FAMILY MEDICINE | Facility: CLINIC | Age: 59
End: 2024-01-29
Payer: COMMERCIAL

## 2024-01-29 VITALS
HEIGHT: 73 IN | HEART RATE: 80 BPM | WEIGHT: 310 LBS | BODY MASS INDEX: 41.08 KG/M2 | DIASTOLIC BLOOD PRESSURE: 74 MMHG | OXYGEN SATURATION: 97 % | RESPIRATION RATE: 16 BRPM | SYSTOLIC BLOOD PRESSURE: 117 MMHG

## 2024-01-29 DIAGNOSIS — R10.32 LLQ ABDOMINAL PAIN: ICD-10-CM

## 2024-01-29 DIAGNOSIS — E11.65 TYPE 2 DIABETES MELLITUS WITH HYPERGLYCEMIA, WITHOUT LONG-TERM CURRENT USE OF INSULIN (H): Primary | ICD-10-CM

## 2024-01-29 DIAGNOSIS — R19.5 POSITIVE COLORECTAL CANCER SCREENING USING COLOGUARD TEST: ICD-10-CM

## 2024-01-29 DIAGNOSIS — R07.89 ATYPICAL CHEST PAIN: ICD-10-CM

## 2024-01-29 PROCEDURE — 93005 ELECTROCARDIOGRAM TRACING: CPT | Performed by: FAMILY MEDICINE

## 2024-01-29 PROCEDURE — 99214 OFFICE O/P EST MOD 30 MIN: CPT | Mod: 25 | Performed by: FAMILY MEDICINE

## 2024-01-29 PROCEDURE — 93010 ELECTROCARDIOGRAM REPORT: CPT | Performed by: INTERNAL MEDICINE

## 2024-01-29 RX ORDER — GLIPIZIDE 5 MG/1
5 TABLET, FILM COATED, EXTENDED RELEASE ORAL DAILY
Qty: 90 TABLET | Refills: 1 | Status: SHIPPED | OUTPATIENT
Start: 2024-01-29 | End: 2024-08-15

## 2024-01-29 ASSESSMENT — ASTHMA QUESTIONNAIRES
ACT_TOTALSCORE: 24
ACT_TOTALSCORE: 24
QUESTION_1 LAST FOUR WEEKS HOW MUCH OF THE TIME DID YOUR ASTHMA KEEP YOU FROM GETTING AS MUCH DONE AT WORK, SCHOOL OR AT HOME: NONE OF THE TIME
QUESTION_4 LAST FOUR WEEKS HOW OFTEN HAVE YOU USED YOUR RESCUE INHALER OR NEBULIZER MEDICATION (SUCH AS ALBUTEROL): NOT AT ALL
QUESTION_5 LAST FOUR WEEKS HOW WOULD YOU RATE YOUR ASTHMA CONTROL: WELL CONTROLLED
QUESTION_3 LAST FOUR WEEKS HOW OFTEN DID YOUR ASTHMA SYMPTOMS (WHEEZING, COUGHING, SHORTNESS OF BREATH, CHEST TIGHTNESS OR PAIN) WAKE YOU UP AT NIGHT OR EARLIER THAN USUAL IN THE MORNING: NOT AT ALL
QUESTION_2 LAST FOUR WEEKS HOW OFTEN HAVE YOU HAD SHORTNESS OF BREATH: NOT AT ALL

## 2024-01-29 NOTE — PROGRESS NOTES
Assessment & Plan     Type 2 diabetes mellitus with hyperglycemia, without long-term current use of insulin (H)  Uncontrolled with last A1c 8%.  He has been on low-dose prednisone for her bilateral Achilles tendinitis which is likely contributing.  Too early for repeat A1c today, but will recommend increasing glipizide to 5 mg daily in order to achieve better control.  He will continue on Actos 30 mg daily.  Unfortunately, GLP-1's contraindicated given prior history of drug-induced pancreatitis.  He declined foot exam today.  Eye exam up-to-date, sent to abstract team.  - glipiZIDE (GLUCOTROL XL) 5 MG 24 hr tablet; Take 1 tablet (5 mg) by mouth daily    Atypical chest pain  Suspect musculoskeletal since reproducible with palpation, though patient is at high risk for cardiac event given comorbidities including BMI 41 and type 2 diabetes.  EKG performed today and unchanged from prior.  Will refer for nuclear stress testing.  Considered recurrent PE given his history, though no pleurisy and oxygen saturation stable at 97%.  Reviewed monitoring signs and when to present for emergent follow-up.  - EKG 12-lead, tracing only  - NM Lexiscan stress test; Future    LLQ abdominal pain  Positive colorectal cancer screening using Cologuard test  Intermittent dull left lower quadrant pain and patient with positive Cologuard 6/2023.  Abdomen is benign today.  We discussed need for diagnostic colonoscopy given his current symptoms and positive Cologuard and he agreed to schedule.  In the meantime we will work on regulating BMs with daily MiraLAX.      Ivet Dickens is a 58 year old, presenting for the following health issues:  Diabetes (Last A1c 12/2023, wants pancreas checked ), Heart Problem (Follow up cardiology, pain under left rib comes and goes), and Establish Care (Pcp was Siomara)        1/29/2024     8:19 AM   Additional Questions   Roomed by DEANDRA Davis CMA   Accompanied by -     Via the [employer]  questionnaire, the patient has reported the following services have been completed -Eye Exam, this information has been sent to the abstraction team.  History of Present Illness       Diabetes:   He presents for follow up of diabetes.  He is checking home blood glucose one time daily.   He checks blood glucose before meals.  Blood glucose is never over 200 and never under 70. He is aware of hypoglycemia symptoms including dizziness and confusion.   He is concerned about other.   He is having weight gain.  The patient has had a diabetic eye exam in the last 12 months. Eye exam performed on 122023. [facility]        Vascular Disease:  He presents for follow up of vascular disease.     He never takes nitroglycerin. He is not taking daily aspirin.    He eats 0-1 servings of fruits and vegetables daily.He consumes 3 sweetened beverage(s) daily.He exercises with enough effort to increase his heart rate 30 to 60 minutes per day.  He exercises with enough effort to increase his heart rate 6 days per week.   He is taking medications regularly.     Last seen 12/5/2023 for routine physical.  A1c at that time 8%.  Currently on pioglitazone 30 mg daily glipizide 2.5 mg daily.  Diarrhea with metformin so not on this.  Declines a foot exam today.  Had eye exam December 2023, release of information signed.    Fasting 120-150. Hypoglycemic symptoms with blood sugars <120.  Previously had pancreatitis with GLP-1's.  Hospitalized for this and was in ICU.    Dealing with achilles tendonitis, so placed on prednisone. Remains on 5 mg daily.  Feels this contributes to his elevated A1c.    Occasional left lower quadrant pain. Feels he may had pulled something over the summer. Flare up occurs around heavy meal. Bms daily flucuate consistency.  No bloody or black stools.  Has not scheduled his colonoscopy for positive Cologuard.    Also has had occasional left chest pain, occurs under the left breast tissue  "along the rib line.  Does not seem to be related to activity or get better with rest.  Can sometimes worsen after a meal.  Notes tenderness is reproducible with palpation.  Does not feels there is associated shortness of breath.  Does have history of PE from prior DVT.  Denies calf swelling, or redness.  Breathing comfortably.        Objective    /74   Pulse 80   Resp 16   Ht 1.848 m (6' 0.75\")   Wt 140.6 kg (310 lb)   SpO2 97%   BMI 41.18 kg/m    Body mass index is 41.18 kg/m .  Physical Exam   GENERAL: alert and no distress  RESP: lungs clear to auscultation - no rales, rhonchi or wheezes  CV: regular rates and rhythm, no murmur, click or rub, and no peripheral edema  ABDOMEN: soft, nontender  MS: Tender to palpation under left breast tissue along rib line  PSYCH: mentation appears normal, affect normal/bright        Signed Electronically by: Dayan Arredondo DO    "

## 2024-02-01 LAB
ATRIAL RATE - MUSE: 76 BPM
DIASTOLIC BLOOD PRESSURE - MUSE: NORMAL MMHG
INTERPRETATION ECG - MUSE: NORMAL
P AXIS - MUSE: 25 DEGREES
PR INTERVAL - MUSE: 194 MS
QRS DURATION - MUSE: 130 MS
QT - MUSE: 374 MS
QTC - MUSE: 420 MS
R AXIS - MUSE: -5 DEGREES
SYSTOLIC BLOOD PRESSURE - MUSE: NORMAL MMHG
T AXIS - MUSE: 46 DEGREES
VENTRICULAR RATE- MUSE: 76 BPM

## 2024-02-06 ENCOUNTER — HOSPITAL ENCOUNTER (OUTPATIENT)
Dept: NUCLEAR MEDICINE | Facility: HOSPITAL | Age: 59
Discharge: HOME | End: 2024-02-06
Attending: FAMILY MEDICINE
Payer: COMMERCIAL

## 2024-02-06 ENCOUNTER — HOSPITAL ENCOUNTER (OUTPATIENT)
Dept: CARDIOLOGY | Facility: HOSPITAL | Age: 59
Discharge: HOME | End: 2024-02-06
Attending: FAMILY MEDICINE
Payer: COMMERCIAL

## 2024-02-06 DIAGNOSIS — R07.89 ATYPICAL CHEST PAIN: ICD-10-CM

## 2024-02-06 PROCEDURE — 343N000001 HC RX 343: Performed by: FAMILY MEDICINE

## 2024-02-06 PROCEDURE — A9500 TC99M SESTAMIBI: HCPCS | Performed by: FAMILY MEDICINE

## 2024-02-06 PROCEDURE — 250N000011 HC RX IP 250 OP 636: Performed by: FAMILY MEDICINE

## 2024-02-06 PROCEDURE — 78452 HT MUSCLE IMAGE SPECT MULT: CPT

## 2024-02-06 PROCEDURE — 93016 CV STRESS TEST SUPVJ ONLY: CPT | Performed by: INTERNAL MEDICINE

## 2024-02-06 RX ORDER — AMINOPHYLLINE 25 MG/ML
50 INJECTION, SOLUTION INTRAVENOUS
Status: DISCONTINUED | OUTPATIENT
Start: 2024-02-06 | End: 2024-02-06 | Stop reason: HOSPADM

## 2024-02-06 RX ORDER — REGADENOSON 0.08 MG/ML
0.4 INJECTION, SOLUTION INTRAVENOUS ONCE
Status: COMPLETED | OUTPATIENT
Start: 2024-02-06 | End: 2024-02-06

## 2024-02-06 RX ORDER — CAFFEINE 200 MG
200 TABLET ORAL
Status: DISCONTINUED | OUTPATIENT
Start: 2024-02-06 | End: 2024-02-06 | Stop reason: HOSPADM

## 2024-02-06 RX ORDER — ALBUTEROL SULFATE 0.83 MG/ML
2.5 SOLUTION RESPIRATORY (INHALATION)
Status: DISCONTINUED | OUTPATIENT
Start: 2024-02-06 | End: 2024-02-06 | Stop reason: HOSPADM

## 2024-02-06 RX ORDER — CAFFEINE CITRATE 20 MG/ML
60 SOLUTION INTRAVENOUS
Status: DISCONTINUED | OUTPATIENT
Start: 2024-02-06 | End: 2024-02-06 | Stop reason: HOSPADM

## 2024-02-06 RX ADMIN — Medication 45.2 MILLICURIE: at 10:03

## 2024-02-06 RX ADMIN — REGADENOSON 0.4 MG: 0.08 INJECTION, SOLUTION INTRAVENOUS at 09:15

## 2024-02-07 ENCOUNTER — HOSPITAL ENCOUNTER (OUTPATIENT)
Dept: NUCLEAR MEDICINE | Facility: HOSPITAL | Age: 59
Discharge: HOME | End: 2024-02-07
Attending: FAMILY MEDICINE
Payer: COMMERCIAL

## 2024-02-07 LAB
CV STRESS CURRENT BP HE: NORMAL
CV STRESS CURRENT HR HE: 102
CV STRESS CURRENT HR HE: 77
CV STRESS CURRENT HR HE: 78
CV STRESS CURRENT HR HE: 79
CV STRESS CURRENT HR HE: 81
CV STRESS CURRENT HR HE: 81
CV STRESS CURRENT HR HE: 82
CV STRESS CURRENT HR HE: 84
CV STRESS CURRENT HR HE: 85
CV STRESS CURRENT HR HE: 85
CV STRESS CURRENT HR HE: 86
CV STRESS CURRENT HR HE: 87
CV STRESS CURRENT HR HE: 88
CV STRESS CURRENT HR HE: 89
CV STRESS CURRENT HR HE: 89
CV STRESS CURRENT HR HE: 90
CV STRESS CURRENT HR HE: 90
CV STRESS CURRENT HR HE: 98
CV STRESS DEVIATION TIME HE: NORMAL
CV STRESS ECHO PERCENT HR HE: NORMAL
CV STRESS EXERCISE STAGE HE: NORMAL
CV STRESS FINAL RESTING BP HE: NORMAL
CV STRESS FINAL RESTING HR HE: 85
CV STRESS MAX HR HE: 108
CV STRESS MAX TREADMILL GRADE HE: 0
CV STRESS MAX TREADMILL SPEED HE: 0
CV STRESS PEAK DIA BP HE: NORMAL
CV STRESS PEAK SYS BP HE: NORMAL
CV STRESS PHASE HE: NORMAL
CV STRESS PROTOCOL HE: NORMAL
CV STRESS RESTING PT POSITION HE: NORMAL
CV STRESS RESTING PT POSITION HE: NORMAL
CV STRESS ST DEVIATION AMOUNT HE: NORMAL
CV STRESS ST DEVIATION ELEVATION HE: NORMAL
CV STRESS ST EVELATION AMOUNT HE: NORMAL
CV STRESS TEST TYPE HE: NORMAL
CV STRESS TOTAL STAGE TIME MIN 1 HE: NORMAL
NUC STRESS EJECTION FRACTION: 69 %
RATE PRESSURE PRODUCT: NORMAL
STRESS ECHO BASELINE DIASTOLIC HE: 91
STRESS ECHO BASELINE HR: 80
STRESS ECHO BASELINE SYSTOLIC BP: 165
STRESS ECHO CALCULATED PERCENT HR: 67 %
STRESS ECHO LAST STRESS DIASTOLIC BP: 94
STRESS ECHO LAST STRESS HR: 89
STRESS ECHO LAST STRESS SYSTOLIC BP: 194
STRESS ECHO TARGET HR: 162

## 2024-02-07 PROCEDURE — 93018 CV STRESS TEST I&R ONLY: CPT | Performed by: INTERNAL MEDICINE

## 2024-02-07 PROCEDURE — A9500 TC99M SESTAMIBI: HCPCS | Performed by: FAMILY MEDICINE

## 2024-02-07 PROCEDURE — 343N000001 HC RX 343: Performed by: FAMILY MEDICINE

## 2024-02-07 PROCEDURE — 78452 HT MUSCLE IMAGE SPECT MULT: CPT | Mod: 26 | Performed by: INTERNAL MEDICINE

## 2024-02-07 RX ADMIN — Medication 45.1 MILLICURIE: at 08:14

## 2024-02-08 ENCOUNTER — TELEPHONE (OUTPATIENT)
Dept: FAMILY MEDICINE | Facility: CLINIC | Age: 59
End: 2024-02-08
Payer: COMMERCIAL

## 2024-02-08 NOTE — TELEPHONE ENCOUNTER
General Call    Contacts         Type Contact Phone/Fax    02/08/2024 11:35 AM CST Phone (Incoming) Maninder Lacy (Self) [phone] (H)          Reason for Call:  patient called back for results from stress test. I read off the note from Dr. Arredondo to patient and he told me he would like a full copy of this. I sent him to [place] to get records obtained.     Date of last appointment with provider: 1-    Okay to leave a detailed message?: Yes at Cell number on file:    No relevant phone numbers on file.

## 2024-04-03 ENCOUNTER — TRANSFERRED RECORDS (OUTPATIENT)
Dept: HEALTH INFORMATION MANAGEMENT | Facility: CLINIC | Age: 59
End: 2024-04-03
Payer: COMMERCIAL

## 2024-04-03 LAB — RETINOPATHY: NEGATIVE

## 2024-04-08 ENCOUNTER — OFFICE VISIT (OUTPATIENT)
Dept: FAMILY MEDICINE | Facility: CLINIC | Age: 59
End: 2024-04-08
Payer: COMMERCIAL

## 2024-04-08 VITALS
DIASTOLIC BLOOD PRESSURE: 75 MMHG | WEIGHT: 304 LBS | TEMPERATURE: 98.2 F | SYSTOLIC BLOOD PRESSURE: 135 MMHG | HEIGHT: 73 IN | HEART RATE: 82 BPM | RESPIRATION RATE: 16 BRPM | OXYGEN SATURATION: 95 % | BODY MASS INDEX: 40.29 KG/M2

## 2024-04-08 DIAGNOSIS — Z12.11 SCREEN FOR COLON CANCER: ICD-10-CM

## 2024-04-08 DIAGNOSIS — I10 ESSENTIAL HYPERTENSION: ICD-10-CM

## 2024-04-08 DIAGNOSIS — R07.89 ATYPICAL CHEST PAIN: ICD-10-CM

## 2024-04-08 DIAGNOSIS — E78.5 HYPERLIPIDEMIA LDL GOAL <130: ICD-10-CM

## 2024-04-08 DIAGNOSIS — E11.65 TYPE 2 DIABETES MELLITUS WITH HYPERGLYCEMIA, WITHOUT LONG-TERM CURRENT USE OF INSULIN (H): Primary | ICD-10-CM

## 2024-04-08 DIAGNOSIS — Z79.01 CHRONIC ANTICOAGULATION: ICD-10-CM

## 2024-04-08 DIAGNOSIS — R19.5 POSITIVE COLORECTAL CANCER SCREENING USING COLOGUARD TEST: ICD-10-CM

## 2024-04-08 LAB
ALBUMIN SERPL BCG-MCNC: 4.4 G/DL (ref 3.5–5.2)
ALP SERPL-CCNC: 78 U/L (ref 40–150)
ALT SERPL W P-5'-P-CCNC: 18 U/L (ref 0–70)
ANION GAP SERPL CALCULATED.3IONS-SCNC: 10 MMOL/L (ref 7–15)
AST SERPL W P-5'-P-CCNC: 14 U/L (ref 0–45)
BILIRUB SERPL-MCNC: 0.6 MG/DL
BUN SERPL-MCNC: 14.9 MG/DL (ref 8–23)
CALCIUM SERPL-MCNC: 9.4 MG/DL (ref 8.6–10)
CHLORIDE SERPL-SCNC: 104 MMOL/L (ref 98–107)
CREAT SERPL-MCNC: 0.77 MG/DL (ref 0.67–1.17)
DEPRECATED HCO3 PLAS-SCNC: 27 MMOL/L (ref 22–29)
EGFRCR SERPLBLD CKD-EPI 2021: >90 ML/MIN/1.73M2
ERYTHROCYTE [DISTWIDTH] IN BLOOD BY AUTOMATED COUNT: 13.4 % (ref 10–15)
GLUCOSE SERPL-MCNC: 138 MG/DL (ref 70–99)
HBA1C MFR BLD: 8 % (ref 0–5.6)
HCT VFR BLD AUTO: 42.1 % (ref 40–53)
HGB BLD-MCNC: 13.6 G/DL (ref 13.3–17.7)
MCH RBC QN AUTO: 28.8 PG (ref 26.5–33)
MCHC RBC AUTO-ENTMCNC: 32.3 G/DL (ref 31.5–36.5)
MCV RBC AUTO: 89 FL (ref 78–100)
PLATELET # BLD AUTO: 302 10E3/UL (ref 150–450)
POTASSIUM SERPL-SCNC: 3.9 MMOL/L (ref 3.4–5.3)
PROT SERPL-MCNC: 6.7 G/DL (ref 6.4–8.3)
RBC # BLD AUTO: 4.73 10E6/UL (ref 4.4–5.9)
SODIUM SERPL-SCNC: 141 MMOL/L (ref 135–145)
WBC # BLD AUTO: 5.5 10E3/UL (ref 4–11)

## 2024-04-08 PROCEDURE — 80053 COMPREHEN METABOLIC PANEL: CPT | Performed by: FAMILY MEDICINE

## 2024-04-08 PROCEDURE — G2211 COMPLEX E/M VISIT ADD ON: HCPCS | Performed by: FAMILY MEDICINE

## 2024-04-08 PROCEDURE — 83036 HEMOGLOBIN GLYCOSYLATED A1C: CPT | Performed by: FAMILY MEDICINE

## 2024-04-08 PROCEDURE — 85027 COMPLETE CBC AUTOMATED: CPT | Performed by: FAMILY MEDICINE

## 2024-04-08 PROCEDURE — 99207 PR FOOT EXAM NO CHARGE: CPT | Performed by: FAMILY MEDICINE

## 2024-04-08 PROCEDURE — 99214 OFFICE O/P EST MOD 30 MIN: CPT | Performed by: FAMILY MEDICINE

## 2024-04-08 PROCEDURE — 36415 COLL VENOUS BLD VENIPUNCTURE: CPT | Performed by: FAMILY MEDICINE

## 2024-04-08 ASSESSMENT — ASTHMA QUESTIONNAIRES
ACT_TOTALSCORE: 23
ACT_TOTALSCORE: 23
QUESTION_2 LAST FOUR WEEKS HOW OFTEN HAVE YOU HAD SHORTNESS OF BREATH: NOT AT ALL
QUESTION_5 LAST FOUR WEEKS HOW WOULD YOU RATE YOUR ASTHMA CONTROL: WELL CONTROLLED
QUESTION_1 LAST FOUR WEEKS HOW MUCH OF THE TIME DID YOUR ASTHMA KEEP YOU FROM GETTING AS MUCH DONE AT WORK, SCHOOL OR AT HOME: A LITTLE OF THE TIME
QUESTION_4 LAST FOUR WEEKS HOW OFTEN HAVE YOU USED YOUR RESCUE INHALER OR NEBULIZER MEDICATION (SUCH AS ALBUTEROL): NOT AT ALL
QUESTION_3 LAST FOUR WEEKS HOW OFTEN DID YOUR ASTHMA SYMPTOMS (WHEEZING, COUGHING, SHORTNESS OF BREATH, CHEST TIGHTNESS OR PAIN) WAKE YOU UP AT NIGHT OR EARLIER THAN USUAL IN THE MORNING: NOT AT ALL

## 2024-04-08 NOTE — PROGRESS NOTES
Assessment & Plan     Type 2 diabetes mellitus with hyperglycemia, without long-term current use of insulin (H)  Uncontrolled with last A1c 8%.  Has been on high-dose prednisone through pulmonology for prolonged URI so suspect this is contributing.  Blood sugars have begun to improve so we will continue glipizide at 5 mg daily dosing, and he will continue Actos. Unfortunately, GLP-1's contraindicated given prior history of drug-induced pancreatitis.  Foot exam updated today and reassuring.  Eye exam up-to-date.  Follow-up 6 months.  - Hemoglobin A1c; Future  - Comprehensive metabolic panel (BMP + Alb, Alk Phos, ALT, AST, Total. Bili, TP); Future  - FOOT EXAM    Essential hypertension  Well-controlled on low-dose lisinopril.  - Comprehensive metabolic panel (BMP + Alb, Alk Phos, ALT, AST, Total. Bili, TP)    Hyperlipidemia LDL goal <130  Well-controlled on rosuvastatin.  Update monitoring labs.  - Comprehensive metabolic panel (BMP + Alb, Alk Phos, ALT, AST, Total. Bili, TP)    Chronic anticoagulation  On Xarelto for history of PE.  - CBC with platelets    Screen for colon cancer  Positive colorectal cancer screening using Cologuard test  Positive Cologuard July 2023.  Patient notes caring for his wife at home and so has been unable to schedule a diagnostic colonoscopy.  Discussed urgency of this to evaluate for potential colon cancer.  Repeat referral placed.  - Adult GI  Referral - Procedure Only; Future    Atypical chest pain  Occasional left-sided chest pain last discussed January 2024.  Nuclear med stress testing at that time was negative for reproducible ischemia, though showed some changes thought to be artifactual.  Although symptoms have improved, he is interested in meeting with cardiology for consult, referral placed.  - Adult Cardiology Eval  Referral; Future    The longitudinal plan of care for the diagnosis(es)/condition(s) as documented were addressed during this visit. Due to the  added complexity in care, I will continue to support Maninder in the subsequent management and with ongoing continuity of care.      Subjective   Maninder is a 59 year old, presenting for the following health issues:  Diabetes (FASTING)        4/8/2024     7:30 AM   Additional Questions   Roomed by DEANDRA Davis CMA   Accompanied by -     History of Present Illness       Diabetes:   He presents for follow up of diabetes.  He is checking home blood glucose one time daily.   He checks blood glucose before meals.  Blood glucose is never over 200 and never under 70. He is aware of hypoglycemia symptoms including shakiness and dizziness.    He has no concerns regarding his diabetes at this time.   He is not experiencing numbness or burning in feet, excessive thirst, blurry vision, weight changes or redness, sores or blisters on feet.           He eats 2-3 servings of fruits and vegetables daily.He consumes 2 sweetened beverage(s) daily.He exercises with enough effort to increase his heart rate 9 or less minutes per day.  He exercises with enough effort to increase his heart rate 4 days per week.   He is taking medications regularly.     A1c 8% 12/5/2023.  Currently on glipizide 5 mg daily, pioglitazone 30 mg daily. Had flulike syptoms x 6 weeks. Feeling better now. Had been on prednisone from pulmonology for about a week. Doesn't want to increase his glipize- blood sugars 100-130.     Last discussed January 29, 2024 occasional left chest pain, occurs under the left breast tissue along the rib line. Does not seem to be related to activity or get better with rest. Can sometimes worsen after a meal. Notes tenderness is reproducible with palpation. Does not feels there is associated shortness of breath. Does have history of PE from prior DVT.  Had nuclear med stress test performed which was negative for ischemia, but did note a possible artifact rather than true ischemia.  Symptoms have improved but still occasionally present.     "  Objective    /75   Pulse 82   Temp 98.2  F (36.8  C)   Resp 16   Ht 1.848 m (6' 0.75\")   Wt 137.9 kg (304 lb)   SpO2 95%   BMI 40.38 kg/m    Body mass index is 40.38 kg/m .  Physical Exam   GENERAL: alert and no distress  RESP: lungs clear to auscultation - no rales, rhonchi or wheezes  CV: regular rate and rhythm, normal S1 S2, no S3 or S4, no murmur  PSYCH: mentation appears normal, affect normal/bright  Diabetic foot exam: normal DP and PT pulses, no trophic changes or ulcerative lesions, normal sensory exam, normal monofilament exam, and swelling base of bilateral Achilles    Results for orders placed or performed in visit on 04/08/24 (from the past 24 hour(s))   Hemoglobin A1c   Result Value Ref Range    Hemoglobin A1C 8.0 (H) 0.0 - 5.6 %   CBC with platelets   Result Value Ref Range    WBC Count 5.5 4.0 - 11.0 10e3/uL    RBC Count 4.73 4.40 - 5.90 10e6/uL    Hemoglobin 13.6 13.3 - 17.7 g/dL    Hematocrit 42.1 40.0 - 53.0 %    MCV 89 78 - 100 fL    MCH 28.8 26.5 - 33.0 pg    MCHC 32.3 31.5 - 36.5 g/dL    RDW 13.4 10.0 - 15.0 %    Platelet Count 302 150 - 450 10e3/uL           Signed Electronically by: Dayan Arredondo DO    "

## 2024-04-08 NOTE — LETTER
April 9, 2024      Maninder Lacy  [address]  [facility] [address]        Dear ,    We are writing to inform you of your test results.    Your glucose and hemoglobin A1c were elevated as discussed in clinic, lets plan to recheck this in 3 to 6 months.  The rest of your labs were normal.  Please schedule a colonoscopy as soon as you are able.    Resulted Orders   Hemoglobin A1c   Result Value Ref Range    Hemoglobin A1C 8.0 (H) 0.0 - 5.6 %      Comment:      Normal <5.7%   Prediabetes 5.7-6.4%    Diabetes 6.5% or higher     Note: Adopted from ADA consensus guidelines.   Comprehensive metabolic panel (BMP + Alb, Alk Phos, ALT, AST, Total. Bili, TP)   Result Value Ref Range    Sodium 141 135 - 145 mmol/L      Comment:      Reference intervals for this test were updated on 09/26/2023 to more accurately reflect our healthy population. There may be differences in the flagging of prior results with similar values performed with this method. Interpretation of those prior results can be made in the context of the updated reference intervals.     Potassium 3.9 3.4 - 5.3 mmol/L    Carbon Dioxide (CO2) 27 22 - 29 mmol/L    Anion Gap 10 7 - 15 mmol/L    Urea Nitrogen 14.9 8.0 - 23.0 mg/dL    Creatinine 0.77 0.67 - 1.17 mg/dL    GFR Estimate >90 >60 mL/min/1.73m2    Calcium 9.4 8.6 - 10.0 mg/dL    Chloride 104 98 - 107 mmol/L    Glucose 138 (H) 70 - 99 mg/dL    Alkaline Phosphatase 78 40 - 150 U/L      Comment:      Reference intervals for this test were updated on 11/14/2023 to more accurately reflect our healthy population. There may be differences in the flagging of prior results with similar values performed with this method. Interpretation of those prior results can be made in the context of the updated reference intervals.    AST 14 0 - 45 U/L      Comment:      Reference intervals for this test were updated on 6/12/2023 to more accurately reflect our healthy population. There may be differences in the  flagging of prior results with similar values performed with this method. Interpretation of those prior results can be made in the context of the updated reference intervals.    ALT 18 0 - 70 U/L      Comment:      Reference intervals for this test were updated on 6/12/2023 to more accurately reflect our healthy population. There may be differences in the flagging of prior results with similar values performed with this method. Interpretation of those prior results can be made in the context of the updated reference intervals.      Protein Total 6.7 6.4 - 8.3 g/dL    Albumin 4.4 3.5 - 5.2 g/dL    Bilirubin Total 0.6 <=1.2 mg/dL   CBC with platelets   Result Value Ref Range    WBC Count 5.5 4.0 - 11.0 10e3/uL    RBC Count 4.73 4.40 - 5.90 10e6/uL    Hemoglobin 13.6 13.3 - 17.7 g/dL    Hematocrit 42.1 40.0 - 53.0 %    MCV 89 78 - 100 fL    MCH 28.8 26.5 - 33.0 pg    MCHC 32.3 31.5 - 36.5 g/dL    RDW 13.4 10.0 - 15.0 %    Platelet Count 302 150 - 450 10e3/uL       If you have any questions or concerns, please call the clinic at the number listed above.       Sincerely,      Dayan Arredondo, DO

## 2024-04-23 ENCOUNTER — OFFICE VISIT (OUTPATIENT)
Dept: CARDIOLOGY | Facility: CLINIC | Age: 59
End: 2024-04-23
Attending: FAMILY MEDICINE
Payer: COMMERCIAL

## 2024-04-23 VITALS
BODY MASS INDEX: 40.65 KG/M2 | HEART RATE: 81 BPM | RESPIRATION RATE: 18 BRPM | DIASTOLIC BLOOD PRESSURE: 75 MMHG | SYSTOLIC BLOOD PRESSURE: 120 MMHG | WEIGHT: 306 LBS

## 2024-04-23 DIAGNOSIS — E78.5 HYPERLIPIDEMIA WITH TARGET LDL LESS THAN 70: ICD-10-CM

## 2024-04-23 DIAGNOSIS — E66.01 MORBID OBESITY (H): ICD-10-CM

## 2024-04-23 DIAGNOSIS — R07.89 ATYPICAL CHEST PAIN: Primary | ICD-10-CM

## 2024-04-23 DIAGNOSIS — E11.9 TYPE 2 DIABETES MELLITUS WITHOUT COMPLICATION, WITHOUT LONG-TERM CURRENT USE OF INSULIN (H): ICD-10-CM

## 2024-04-23 PROCEDURE — 99204 OFFICE O/P NEW MOD 45 MIN: CPT | Performed by: INTERNAL MEDICINE

## 2024-04-23 NOTE — LETTER
4/23/2024    Dayan Arredondo,   [address]  [facility] [address]    RE: Maninder Lacy       Dear Colleague,     I had the pleasure of seeing Maninder Lacy in the [facility].      Thank you, Dr. Olivia Arredondo, for asking the [facility] team to see Mr. Maninder Lacy to evaluate chest discomfort, mildly abnormal nuclear stress test   Assessment/Recommendations   Assessment:    1. Chest discomfort, atypical for myocardial ischemia.  He describes a tingling sensation occurring intermittently under the left breast, not associated with physical activity.  Not clear whether anxiety provoking as he was talking about his wife's cancer with nursing staff at the time of his stress test where they noted his heart rate and blood pressure to be running high.  Denies any recurrence of the symptoms since right before the stress test.  As you know, the stress test suggested a small area of mild ischemia involving the distal septal and apical wall although there was question raised of possible artifact due to tissue attenuation.  At this point he denies any decline in activity level or clear exertional symptoms.  Suggested we continue to monitor for now.  If symptoms recur, could consider CT coronary angiogram.  2.  Hypercholesterolemia, well-controlled on current dose of statin  3.  Type 2 diabetes mellitus  4.  Obesity    Plan:  1.  Continue current medications  2.  Encouraged consideration of an exercise program to help with weight loss  3.  Consider CT coronary angiogram if recurrent chest discomfort symptoms  4.  Follow-up with me as needed       History of Present Illness    Mr. Maninder Lacy is a 59 year old male with history of hyperlipidemia, type 2 diabetes mellitus, morbid obesity who was referred by primary care for a nuclear stress test back in December 2023 for symptoms of vague left-sided chest discomfort.  Patient describes it as a tingling sensation under the left breast  which would occur randomly.  Not closely associated with physical activity.  He did note resolution of the symptoms just shortly before his stress test which suggested a small area of mild ischemia involving the distal anteroseptal wall and apex.  Has since had continued resolution of those symptoms which she questions in retrospect may have been due to anxiety with his wife's cancer diagnosis.  Because of the mild defect and the fact that his father had bypass surgery in his early 60s, he requested cardiac consultation.  Denies any complaints of exertional chest discomfort or dyspnea.  There has been no decline in his ability to do his job which is physically active.  Also reports no decline in ability to go up and down the stairs of his house or do shoveling/yard work.    ECG (personally reviewed): No ECG today    Cardiac Imaging Studies (personally reviewed): No new cardiac imaging     Physical Examination Review of Systems   /75 (BP Location: Right arm, Patient Position: Sitting, Cuff Size: Adult Large)   Pulse 81   Resp 18   Wt 138.8 kg (306 lb)   BMI 40.65 kg/m    Body mass index is 40.65 kg/m .  Wt Readings from Last 3 Encounters:   04/23/24 138.8 kg (306 lb)   04/08/24 137.9 kg (304 lb)   01/29/24 140.6 kg (310 lb)     General Appearance:   Awake, Alert, No acute distress.   HEENT:  No scleral icterus; the mucous membranes were pink and moist.   Neck: No cervical bruits or jugular venous distention    Chest: The spine was straight. The chest was symmetric.   Lungs:   Respirations unlabored; the lungs are clear to auscultation. No wheezing   Cardiovascular:   Regular rate and rhythm.  S1, S2 normal.  No murmur or gallop   Abdomen:  No organomegaly, masses, bruits, or tenderness. Bowels sounds are present   Extremities: No peripheral edema bilaterally   Skin: No xanthelasma. Warm, Dry.   Musculoskeletal: No tenderness.   Neurologic: Mood and affect are appropriate.    Enc Vitals  BP: 120/75  Pulse:  81  Resp: 18  Weight: 138.8 kg (306 lb)                                         Medical History  Surgical History Family History Social History   Past Medical History:   Diagnosis Date    Breast injury 04/01/2016    Diabetes (H)     Hyperlipidemia     Pulmonary edema     Past Surgical History:   Procedure Laterality Date    HC KNEE SCOPE, DIAGNOSTIC      Description: Arthroscopy Knee Right;  Recorded: 08/06/2010;  Comments: august, 2010 for medial meniscal tear    Family History   Problem Relation Age of Onset    Pacemaker Mother     Coronary Artery Disease Father 65        CABG    Cancer Father         stomach and bladder    Breast Cancer Paternal Aunt     Social History     Socioeconomic History    Marital status:      Spouse name: Not on file    Number of children: Not on file    Years of education: Not on file    Highest education level: Not on file   Occupational History    Not on file   Tobacco Use    Smoking status: Never     Passive exposure: Yes    Smokeless tobacco: Never    Tobacco comments:     As a child, parents smoked.   Substance and Sexual Activity    Alcohol use: Not on file    Drug use: Not on file    Sexual activity: Not on file   Other Topics Concern    Not on file   Social History Narrative    Not on file     Social Determinants of Health     Financial Resource Strain: Low Risk  (1/29/2024)    Financial Resource Strain     Within the past 12 months, have you or your family members you live with been unable to get utilities (heat, electricity) when it was really needed?: No   Food Insecurity: Low Risk  (1/29/2024)    Food Insecurity     Within the past 12 months, did you worry that your food would run out before you got money to buy more?: No     Within the past 12 months, did the food you bought just not last and you didn t have money to get more?: No   Transportation Needs: Low Risk  (1/29/2024)    Transportation Needs     Within the past 12 months, has lack of transportation kept you  from medical appointments, getting your medicines, non-medical meetings or appointments, work, or from getting things that you need?: No   Physical Activity: Not on file   Stress: Not on file   Social Connections: Unknown (12/28/2021)    Received from [facility], [facility]    Social Connections     Frequency of Communication with Friends and Family: Not on file   Interpersonal Safety: Low Risk  (12/5/2023)    Interpersonal Safety     Do you feel physically and emotionally safe where you currently live?: Yes     Within the past 12 months, have you been hit, slapped, kicked or otherwise physically hurt by someone?: No     Within the past 12 months, have you been humiliated or emotionally abused in other ways by your partner or ex-partner?: No   Housing Stability: Low Risk  (1/29/2024)    Housing Stability     Do you have housing? : Yes     Are you worried about losing your housing?: No          Medications  Allergies   Current Outpatient Medications   Medication Sig Dispense Refill    albuterol (PROAIR HFA/PROVENTIL HFA/VENTOLIN HFA) 108 (90 Base) MCG/ACT inhaler Inhale 2 puffs into the lungs every 8 hours as needed      Ascorbic Acid (VITAMIN C) 500 MG CAPS       blood sugar diagnostic (ONE TOUCH ULTRA TEST) Strp [BLOOD SUGAR DIAGNOSTIC (ONE TOUCH ULTRA TEST) STRP] Use As Directed.      FASENRA PEN 30 MG/ML SOAJ auto-injector pen Inject 30 mg Subcutaneous every 2 months      fluticasone propion-salmeterol (ADVAIR) 500-50 mcg/dose DISKUS Inhale 1 puff into the lungs 2 times daily wixela      glipiZIDE (GLUCOTROL XL) 5 MG 24 hr tablet Take 1 tablet (5 mg) by mouth daily 90 tablet 1    lisinopril (ZESTRIL) 5 MG tablet Take 1 tablet (5 mg) by mouth daily 90 tablet 3    multivitamin capsule [MULTIVITAMIN CAPSULE] Take 1 capsule by mouth daily.      omeprazole (PRILOSEC) 20 MG DR capsule TAKE 1 CAPSULE(20 MG) BY MOUTH DAILY BEFORE BREAKFAST/  Strength: 20  mg 90 capsule 3    pioglitazone (ACTOS) 30 MG tablet Take 1 tablet (30 mg) by mouth daily 90 tablet 3    predniSONE (DELTASONE) 5 MG tablet Take 5 mg by mouth daily      rivaroxaban ANTICOAGULANT (XARELTO ANTICOAGULANT) 10 MG TABS tablet Take 1 tablet (10 mg) by mouth daily 90 tablet 1    rosuvastatin (CRESTOR) 20 MG tablet Take 1 tablet (20 mg) by mouth daily 90 tablet 3    vitamin D3 (CHOLECALCIFEROL) 50 mcg (2000 units) tablet Take 1 tablet by mouth daily        Allergies   Allergen Reactions    Metformin Diarrhea    Aspirin Unknown     On Xarelto     Cephalexin Unknown    Theophylline Unknown         Lab Results    Chemistry/lipid CBC Cardiac Enzymes/BNP/TSH/INR   Recent Labs   Lab Test 04/08/24  0756 12/05/23  0918   TRIG  --  96   LDL  --  68   BUN 14.9 15.0    140   CO2 27 27    Recent Labs   Lab Test 04/08/24  0756   WBC 5.5   HGB 13.6   HCT 42.1   MCV 89       Recent Labs   Lab Test 04/04/19  1337   TSH 0.56   INR 1.00        A total of 50 minutes was spent reviewing patient's medical records, obtaining history and performing examination, as well as discussing diagnoses/ recommendations with patient and answering all questions.                        Thank you for allowing me to participate in the care of your patient.      Sincerely,     Ronda Jackson MD     [facility]  cc:   Dayan Arredondo DO  [address] [place] [address]

## 2024-04-23 NOTE — PATIENT INSTRUCTIONS
Continue current medications  Consider beginning an exercise program to help with weight loss   Contact me if recurrent chest symptoms. If they recur, would consider CT coronary angiogram.

## 2024-04-23 NOTE — PROGRESS NOTES
Thank you, Dr. Olivia Arredondo, for asking the [facility] team to see Mr. Maninder Lacy to evaluate chest discomfort, mildly abnormal nuclear stress test   Assessment/Recommendations   Assessment:    1. Chest discomfort, atypical for myocardial ischemia.  He describes a tingling sensation occurring intermittently under the left breast, not associated with physical activity.  Not clear whether anxiety provoking as he was talking about his wife's cancer with nursing staff at the time of his stress test where they noted his heart rate and blood pressure to be running high.  Denies any recurrence of the symptoms since right before the stress test.  As you know, the stress test suggested a small area of mild ischemia involving the distal septal and apical wall although there was question raised of possible artifact due to tissue attenuation.  At this point he denies any decline in activity level or clear exertional symptoms.  Suggested we continue to monitor for now.  If symptoms recur, could consider CT coronary angiogram.  2.  Hypercholesterolemia, well-controlled on current dose of statin  3.  Type 2 diabetes mellitus  4.  Obesity    Plan:  1.  Continue current medications  2.  Encouraged consideration of an exercise program to help with weight loss  3.  Consider CT coronary angiogram if recurrent chest discomfort symptoms  4.  Follow-up with me as needed       History of Present Illness    Mr. Maninder Lacy is a 59 year old male with history of hyperlipidemia, type 2 diabetes mellitus, morbid obesity who was referred by primary care for a nuclear stress test back in December 2023 for symptoms of vague left-sided chest discomfort.  Patient describes it as a tingling sensation under the left breast which would occur randomly.  Not closely associated with physical activity.  He did note resolution of the symptoms just shortly before his stress test which suggested a small area of mild ischemia  involving the distal anteroseptal wall and apex.  Has since had continued resolution of those symptoms which she questions in retrospect may have been due to anxiety with his wife's cancer diagnosis.  Because of the mild defect and the fact that his father had bypass surgery in his early 60s, he requested cardiac consultation.  Denies any complaints of exertional chest discomfort or dyspnea.  There has been no decline in his ability to do his job which is physically active.  Also reports no decline in ability to go up and down the stairs of his house or do shoveling/yard work.    ECG (personally reviewed): No ECG today    Cardiac Imaging Studies (personally reviewed): No new cardiac imaging     Physical Examination Review of Systems   /75 (BP Location: Right arm, Patient Position: Sitting, Cuff Size: Adult Large)   Pulse 81   Resp 18   Wt 138.8 kg (306 lb)   BMI 40.65 kg/m    Body mass index is 40.65 kg/m .  Wt Readings from Last 3 Encounters:   04/23/24 138.8 kg (306 lb)   04/08/24 137.9 kg (304 lb)   01/29/24 140.6 kg (310 lb)     General Appearance:   Awake, Alert, No acute distress.   HEENT:  No scleral icterus; the mucous membranes were pink and moist.   Neck: No cervical bruits or jugular venous distention    Chest: The spine was straight. The chest was symmetric.   Lungs:   Respirations unlabored; the lungs are clear to auscultation. No wheezing   Cardiovascular:   Regular rate and rhythm.  S1, S2 normal.  No murmur or gallop   Abdomen:  No organomegaly, masses, bruits, or tenderness. Bowels sounds are present   Extremities: No peripheral edema bilaterally   Skin: No xanthelasma. Warm, Dry.   Musculoskeletal: No tenderness.   Neurologic: Mood and affect are appropriate.    Enc Vitals  BP: 120/75  Pulse: 81  Resp: 18  Weight: 138.8 kg (306 lb)                                         Medical History  Surgical History Family History Social History   Past Medical History:   Diagnosis Date    Breast  injury 04/01/2016    Diabetes (H)     Hyperlipidemia     Pulmonary edema     Past Surgical History:   Procedure Laterality Date    HC KNEE SCOPE, DIAGNOSTIC      Description: Arthroscopy Knee Right;  Recorded: 08/06/2010;  Comments: august, 2010 for medial meniscal tear    Family History   Problem Relation Age of Onset    Pacemaker Mother     Coronary Artery Disease Father 65        CABG    Cancer Father         stomach and bladder    Breast Cancer Paternal Aunt     Social History     Socioeconomic History    Marital status:      Spouse name: Not on file    Number of children: Not on file    Years of education: Not on file    Highest education level: Not on file   Occupational History    Not on file   Tobacco Use    Smoking status: Never     Passive exposure: Yes    Smokeless tobacco: Never    Tobacco comments:     As a child, parents smoked.   Substance and Sexual Activity    Alcohol use: Not on file    Drug use: Not on file    Sexual activity: Not on file   Other Topics Concern    Not on file   Social History Narrative    Not on file     Social Determinants of Health     Financial Resource Strain: Low Risk  (1/29/2024)    Financial Resource Strain     Within the past 12 months, have you or your family members you live with been unable to get utilities (heat, electricity) when it was really needed?: No   Food Insecurity: Low Risk  (1/29/2024)    Food Insecurity     Within the past 12 months, did you worry that your food would run out before you got money to buy more?: No     Within the past 12 months, did the food you bought just not last and you didn t have money to get more?: No   Transportation Needs: Low Risk  (1/29/2024)    Transportation Needs     Within the past 12 months, has lack of transportation kept you from medical appointments, getting your medicines, non-medical meetings or appointments, work, or from getting things that you need?: No   Physical Activity: Not on file   Stress: Not on file    Social Connections: Unknown (12/28/2021)    Received from [facility], [facility]    Social Connections     Frequency of Communication with Friends and Family: Not on file   Interpersonal Safety: Low Risk  (12/5/2023)    Interpersonal Safety     Do you feel physically and emotionally safe where you currently live?: Yes     Within the past 12 months, have you been hit, slapped, kicked or otherwise physically hurt by someone?: No     Within the past 12 months, have you been humiliated or emotionally abused in other ways by your partner or ex-partner?: No   Housing Stability: Low Risk  (1/29/2024)    Housing Stability     Do you have housing? : Yes     Are you worried about losing your housing?: No          Medications  Allergies   Current Outpatient Medications   Medication Sig Dispense Refill    albuterol (PROAIR HFA/PROVENTIL HFA/VENTOLIN HFA) 108 (90 Base) MCG/ACT inhaler Inhale 2 puffs into the lungs every 8 hours as needed      Ascorbic Acid (VITAMIN C) 500 MG CAPS       blood sugar diagnostic (ONE TOUCH ULTRA TEST) Strp [BLOOD SUGAR DIAGNOSTIC (ONE TOUCH ULTRA TEST) STRP] Use As Directed.      FASENRA PEN 30 MG/ML SOAJ auto-injector pen Inject 30 mg Subcutaneous every 2 months      fluticasone propion-salmeterol (ADVAIR) 500-50 mcg/dose DISKUS Inhale 1 puff into the lungs 2 times daily wixela      glipiZIDE (GLUCOTROL XL) 5 MG 24 hr tablet Take 1 tablet (5 mg) by mouth daily 90 tablet 1    lisinopril (ZESTRIL) 5 MG tablet Take 1 tablet (5 mg) by mouth daily 90 tablet 3    multivitamin capsule [MULTIVITAMIN CAPSULE] Take 1 capsule by mouth daily.      omeprazole (PRILOSEC) 20 MG DR capsule TAKE 1 CAPSULE(20 MG) BY MOUTH DAILY BEFORE BREAKFAST/  Strength: 20 mg 90 capsule 3    pioglitazone (ACTOS) 30 MG tablet Take 1 tablet (30 mg) by mouth daily 90 tablet 3    predniSONE (DELTASONE) 5 MG tablet Take 5 mg by mouth daily      rivaroxaban  ANTICOAGULANT (XARELTO ANTICOAGULANT) 10 MG TABS tablet Take 1 tablet (10 mg) by mouth daily 90 tablet 1    rosuvastatin (CRESTOR) 20 MG tablet Take 1 tablet (20 mg) by mouth daily 90 tablet 3    vitamin D3 (CHOLECALCIFEROL) 50 mcg (2000 units) tablet Take 1 tablet by mouth daily        Allergies   Allergen Reactions    Metformin Diarrhea    Aspirin Unknown     On Xarelto     Cephalexin Unknown    Theophylline Unknown         Lab Results    Chemistry/lipid CBC Cardiac Enzymes/BNP/TSH/INR   Recent Labs   Lab Test 04/08/24  0756 12/05/23  0918   TRIG  --  96   LDL  --  68   BUN 14.9 15.0    140   CO2 27 27    Recent Labs   Lab Test 04/08/24  0756   WBC 5.5   HGB 13.6   HCT 42.1   MCV 89       Recent Labs   Lab Test 04/04/19  1337   TSH 0.56   INR 1.00        A total of 50 minutes was spent reviewing patient's medical records, obtaining history and performing examination, as well as discussing diagnoses/ recommendations with patient and answering all questions.

## 2024-04-26 ENCOUNTER — TELEPHONE (OUTPATIENT)
Dept: ANTICOAGULATION | Facility: CLINIC | Age: 59
End: 2024-04-26

## 2024-04-26 NOTE — TELEPHONE ENCOUNTER
----- Message from Atiya Taylor RN sent at 4/26/2024 11:12 AM CDT -----  PLEASE ENSURE TO SCHEDULE PREOP FOR PATIENT.  HE IS SCHEDULED FOR COLONOSCOPY ON 5/23/24.    PER DR. ESPARZA'S NOTE:    Dayan Arredondo, Dayan Brewer Care Team Pool23 hours ago (11:19 AM)    HS  This should be a preop visit. If unable to get in before his procedure, please forward request to our [facility] team.    Arnulfo

## 2024-04-26 NOTE — TELEPHONE ENCOUNTER
[facility]-PROCEDURAL ANTICOAGULATION  MANAGEMENT    [facility] requesting pre-procedure hold orders for warfarin and review for bridging  Per note on 4/25/24   -  Additional comments: needs orders for bridging zorelto    Needs 2 day hold prior to colonoscopy 5/23/24      Procedure date: 5/23/24       Procedure: Colonoscopy      Procedure location and phone number (if external): [facility]  ([phone])     Number of warfarin hold days requested and/or target INR:  2 days    Pre-op date:m 5/16/24 with Dr. Arredondo      Routing to Anticoagulation Pharmacist for review.      Atiya Taylor RN

## 2024-05-09 ENCOUNTER — TELEPHONE (OUTPATIENT)
Dept: FAMILY MEDICINE | Facility: CLINIC | Age: 59
End: 2024-05-09
Payer: COMMERCIAL

## 2024-05-09 NOTE — TELEPHONE ENCOUNTER
Reason for Call:  Other prescription    Detailed comments: Requesting verbal hold orders for Xarelto RX for Colonoscopy on 05/23/24.     Starting hold on 05/21/24 until 05/23/24     If patient is not able to do hold, requesting Creatinine Labs done within 90 day of procedure date.     Phone Number Patient can be reached at: Other phone number:  [phone]    Best Time: Few days before 05/21    Can we leave a detailed message on this number? YES    Call taken on 5/9/2024 at 8:23 AM by Ruma Brannon

## 2024-05-16 ENCOUNTER — OFFICE VISIT (OUTPATIENT)
Dept: FAMILY MEDICINE | Facility: CLINIC | Age: 59
End: 2024-05-16
Payer: COMMERCIAL

## 2024-05-16 VITALS
HEIGHT: 73 IN | RESPIRATION RATE: 20 BRPM | SYSTOLIC BLOOD PRESSURE: 141 MMHG | HEART RATE: 81 BPM | WEIGHT: 307 LBS | BODY MASS INDEX: 40.69 KG/M2 | TEMPERATURE: 98.2 F | DIASTOLIC BLOOD PRESSURE: 83 MMHG | OXYGEN SATURATION: 95 %

## 2024-05-16 DIAGNOSIS — E11.65 TYPE 2 DIABETES MELLITUS WITH HYPERGLYCEMIA, WITHOUT LONG-TERM CURRENT USE OF INSULIN (H): ICD-10-CM

## 2024-05-16 DIAGNOSIS — Z79.01 CHRONIC ANTICOAGULATION: ICD-10-CM

## 2024-05-16 DIAGNOSIS — Z86.711 HISTORY OF PULMONARY EMBOLISM: ICD-10-CM

## 2024-05-16 DIAGNOSIS — Z12.11 SCREEN FOR COLON CANCER: ICD-10-CM

## 2024-05-16 DIAGNOSIS — R19.5 POSITIVE COLORECTAL CANCER SCREENING USING COLOGUARD TEST: ICD-10-CM

## 2024-05-16 DIAGNOSIS — Z01.818 PREOP GENERAL PHYSICAL EXAM: Primary | ICD-10-CM

## 2024-05-16 DIAGNOSIS — I10 ESSENTIAL HYPERTENSION: ICD-10-CM

## 2024-05-16 PROCEDURE — 99214 OFFICE O/P EST MOD 30 MIN: CPT | Performed by: FAMILY MEDICINE

## 2024-05-16 PROCEDURE — G2211 COMPLEX E/M VISIT ADD ON: HCPCS | Performed by: FAMILY MEDICINE

## 2024-05-16 NOTE — PROGRESS NOTES
Preoperative Evaluation  [facility]  [address] [facility] [address]  Phone: [phone]  Fax: [phone]  Primary Provider: Dayan Arredondo DO  Pre-op Performing Provider: Dayan Arredondo DO  May 16, 2024             5/16/2024   Surgical Information   What procedure is being done? colonoscopy   Facility or Hospital where procedure/surgery will be performed: [facility]   Who is doing the procedure / surgery? [facility]   Date of surgery / procedure: may 23   Time of surgery / procedure: 830   Where do you plan to recover after surgery? at home with family     Fax number for surgical facility: [phone]    Assessment & Plan     The proposed surgical procedure is considered LOW risk.    Preop general physical exam  Positive colorectal cancer screening using Cologuard test  Screen for colon cancer  Undergoing diagnostic colonoscopy for positive Cologuard.  Asymptomatic.     - No identified additional risk factors other than previously addressed    Antiplatelet or Anticoagulation Medication Instructions   - apixaban (Eliquis), edoxaban (Savaysa), rivaroxaban (Xarelto): Bleeding risk is moderate or high for this procedure AND CrCl  (>=) 50 mL/min. DO NOT TAKE 2 days before surgery.     Additional Medication Instructions   - LABA, inhaled corticosteroid, long-acting anticholinergics: Continue without modification.   - rescue Inhaler: Continue PRN. Bring to hospital on the day of surgery.   - Prednisone: Take usual dose on day of surgery  - Otherwise hold all other medications, particularly diabetic meds due to risk of hypoglycemia    Recommendation  Approval given to proceed with proposed procedure, without further diagnostic evaluation.    Type 2 diabetes mellitus with hyperglycemia, without long-term current use of insulin (H)  A1c elevated to 8% on glipizide and Actos.  He will return in October for recheck.    Essential hypertension  Near goal on lisinopril.    Chronic  anticoagulation  History of pulmonary embolism  On Xarelto for history of pulmonary embolism ~20 years ago.     The longitudinal plan of care for the diagnosis(es)/condition(s) as documented were addressed during this visit. Due to the added complexity in care, I will continue to support Maninder in the subsequent management and with ongoing continuity of care.      Ivet Dickens is a 59 year old, presenting for the following:  Pre-Op Exam (Colonoscopy, 5/23, [facility])          5/16/2024     7:49 AM   Additional Questions   Roomed by DEANDRA Davis CMA   Accompanied by -     HPI related to upcoming procedure: Positive Cologuard July 2023. Patient notes caring for his wife at home and so has been unable to schedule a diagnostic colonoscopy until now.        5/16/2024   Pre-Op Questionnaire   Have you ever had a heart attack or stroke? No   Have you ever had surgery on your heart or blood vessels, such as a stent placement, a coronary artery bypass, or surgery on an artery in your head, neck, heart, or legs? No   Do you have chest pain with activity? No   Do you have a history of heart failure? No   Do you currently have a cold, bronchitis or symptoms of other infection? No   Do you have a cough, shortness of breath, or wheezing? No   Do you or anyone in your family have previous history of blood clots? (!) YES: PE 20 so years ago   Do you or does anyone in your family have a serious bleeding problem such as prolonged bleeding following surgeries or cuts? No   Have you ever had problems with anemia or been told to take iron pills? No   Have you had any abnormal blood loss such as black, tarry or bloody stools? No   Have you ever had a blood transfusion? No   Are you willing to have a blood transfusion if it is medically needed before, during, or after your surgery? Yes   Have you or any of your relatives ever had problems with anesthesia? No   Do you have sleep apnea, excessive snoring or daytime drowsiness? No   Do you have  any artifical heart valves or other implanted medical devices like a pacemaker, defibrillator, or continuous glucose monitor? No   Do you have artificial joints? No   Are you allergic to latex? No     Health Care Directive  Patient does not have a Health Care Directive or Living Will: Discussed advance care planning with patient; however, patient declined at this time.    Preoperative Review of    reviewed - no record of controlled substances prescribed.      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 DVT RLE.        Asthma      Priority: Medium     Created by Conversion        Type II diabetes mellitus (H)      Priority: Medium     Created by Conversion        Pancreatitis      Priority: Medium     Created by Conversion  [facility] Annotation: Sep 15 2009  1:38PM - Ashok Frederick: admitted    8/29/09 for mild episode  Replacement Utility updated for latest IMO load        F[place]ardo      Priority: Medium     Created by Conversion  [facility] Annotation: Oct  9 2007 10:52AM - Seb Lopez: 35% body,   1   month at [facility] burn cheek  Replacement Utility updated for latest IMO load        Essential Hypertension      Priority: Medium     Created by Conversion  Replacement Utility updated for latest IMO load        Chronic Pansinusitis      Priority: Medium     Created by Conversion        Chronic Reflux Esophagitis      Priority: Medium     Created by Conversion        Obesity       Priority: Medium     Created by Conversion          Past Medical History:   Diagnosis Date    Breast injury 04/01/2016    Diabetes (H)     Hyperlipidemia     Pulmonary edema      Past Surgical History:   Procedure Laterality Date    HC KNEE SCOPE, DIAGNOSTIC      Description: Arthroscopy Knee Right;  Recorded: 08/06/2010;  Comments: august, 2010 for medial meniscal tear     Current Outpatient Medications   Medication Sig Dispense Refill    albuterol (PROAIR HFA/PROVENTIL HFA/VENTOLIN HFA) 108 (90 Base) MCG/ACT inhaler Inhale 2 puffs into the lungs every 8 hours as needed      Ascorbic Acid (VITAMIN C) 500 MG CAPS       blood sugar diagnostic (ONE TOUCH ULTRA TEST) Strp [BLOOD SUGAR DIAGNOSTIC (ONE TOUCH ULTRA TEST) STRP] Use As Directed.      FASENRA PEN 30 MG/ML SOAJ auto-injector pen Inject 30 mg Subcutaneous every 2 months      fluticasone propion-salmeterol (ADVAIR) 500-50 mcg/dose DISKUS Inhale 1 puff into the lungs 2 times daily wixela      glipiZIDE (GLUCOTROL XL) 5 MG 24 hr tablet Take 1 tablet (5 mg) by mouth daily 90 tablet 1    lisinopril (ZESTRIL) 5 MG tablet Take 1 tablet (5 mg) by mouth daily 90 tablet 3    multivitamin capsule [MULTIVITAMIN CAPSULE] Take 1 capsule by mouth daily.      omeprazole (PRILOSEC) 20 MG DR capsule TAKE 1 CAPSULE(20 MG) BY MOUTH DAILY BEFORE BREAKFAST/  Strength: 20 mg 90 capsule 3    pioglitazone (ACTOS) 30 MG tablet Take 1 tablet (30 mg) by mouth daily 90 tablet 3    predniSONE (DELTASONE) 5 MG tablet Take 5 mg by mouth daily      rivaroxaban ANTICOAGULANT (XARELTO ANTICOAGULANT) 10 MG TABS tablet Take 1 tablet (10 mg) by mouth daily 90 tablet 1    rosuvastatin (CRESTOR) 20 MG tablet Take 1 tablet (20 mg) by mouth daily 90 tablet 3    vitamin D3 (CHOLECALCIFEROL) 50 mcg (2000 units) tablet Take 1 tablet by mouth daily         Allergies   Allergen Reactions    Metformin Diarrhea    Aspirin Unknown     On Xarelto     Cephalexin Unknown    Theophylline Unknown        Social  "History     Tobacco Use    Smoking status: Never     Passive exposure: Yes    Smokeless tobacco: Never    Tobacco comments:     As a child, parents smoked.   Substance Use Topics    Alcohol use: Not on file     Family History   Problem Relation Age of Onset    Pacemaker Mother     Coronary Artery Disease Father 65        CABG    Cancer Father         stomach and bladder    Breast Cancer Paternal Aunt      History   Drug Use Not on file             Review of Systems  Constitutional, neuro, ENT, endocrine, pulmonary, cardiac, gastrointestinal, genitourinary, musculoskeletal, integument and psychiatric systems are negative, except as otherwise noted.    Objective    BP (!) 141/83   Pulse 81   Temp 98.2  F (36.8  C)   Resp 20   Ht 1.848 m (6' 0.75\")   Wt 139.3 kg (307 lb)   SpO2 95%   BMI 40.78 kg/m     Estimated body mass index is 40.78 kg/m  as calculated from the following:    Height as of this encounter: 1.848 m (6' 0.75\").    Weight as of this encounter: 139.3 kg (307 lb).    Physical Exam:  General: Alert, cooperative, NAD  HEENT: NC/AT, EOMI, neck supple, no lymphadenopathy, no thyroid enlargement.  CV: RRR, no murmurs, rubs or gallops, 2  Respiratory: normal effort, lungs CTAB with good aeration throughout  Abdomen:  soft,  ND, NT  Extremities: warm, trace edema bilaterally to mid shin  Psych: Mentation appears normal, affect normal/bright, judgement and insight intact, normal speech and appearance well-groomed  Neuro: A&O x 3, CN II-XII grossly intact, no focal deficits    "

## 2024-05-16 NOTE — PATIENT INSTRUCTIONS
Take prednisone with small sip of water. Take puff of advair the morning of procedure. Otherwise no meds/nothing to eat or drink    Hold xarelto 2 days in advance

## 2024-05-23 ENCOUNTER — TRANSFERRED RECORDS (OUTPATIENT)
Dept: HEALTH INFORMATION MANAGEMENT | Facility: CLINIC | Age: 59
End: 2024-05-23
Payer: COMMERCIAL

## 2024-06-17 DIAGNOSIS — I26.99 OTHER PULMONARY EMBOLISM WITHOUT ACUTE COR PULMONALE, UNSPECIFIED CHRONICITY (H): ICD-10-CM

## 2024-06-17 NOTE — TELEPHONE ENCOUNTER
Medication Question or Refill    Contacts         Type Contact Phone/Fax    06/17/2024 10:10 AM CDT Fax (Incoming) [facility] #[id number] [place] [address] [address] (Pharmacy) [phone]            What medication are you calling about (include dose and sig)?: rivaroxaban ANTICOAGULANT (XARELTO ANTICOAGULANT) 10 MG TABS tablet     Preferred Pharmacy:   [facility] #[id number] [place] [address] [address] [address]  Phone: [phone] Fax: [phone]      Controlled Substance Agreement on file:   CSA -- Patient Level:    CSA: None found at the patient level.           Do you have any questions or concerns?  Yes: incoming fax from pharmacy requesting refill

## 2024-06-19 DIAGNOSIS — I26.99 OTHER PULMONARY EMBOLISM WITHOUT ACUTE COR PULMONALE, UNSPECIFIED CHRONICITY (H): ICD-10-CM

## 2024-08-15 DIAGNOSIS — E11.65 TYPE 2 DIABETES MELLITUS WITH HYPERGLYCEMIA, WITHOUT LONG-TERM CURRENT USE OF INSULIN (H): ICD-10-CM

## 2024-08-15 RX ORDER — GLIPIZIDE 5 MG/1
5 TABLET, FILM COATED, EXTENDED RELEASE ORAL DAILY
Qty: 90 TABLET | Refills: 0 | Status: SHIPPED | OUTPATIENT
Start: 2024-08-15

## 2024-10-07 ENCOUNTER — OFFICE VISIT (OUTPATIENT)
Dept: FAMILY MEDICINE | Facility: CLINIC | Age: 59
End: 2024-10-07
Payer: COMMERCIAL

## 2024-10-07 VITALS
WEIGHT: 304.8 LBS | HEIGHT: 73 IN | DIASTOLIC BLOOD PRESSURE: 81 MMHG | TEMPERATURE: 98.1 F | RESPIRATION RATE: 20 BRPM | SYSTOLIC BLOOD PRESSURE: 136 MMHG | BODY MASS INDEX: 40.39 KG/M2 | HEART RATE: 79 BPM | OXYGEN SATURATION: 97 %

## 2024-10-07 DIAGNOSIS — E11.65 TYPE 2 DIABETES MELLITUS WITH HYPERGLYCEMIA, WITHOUT LONG-TERM CURRENT USE OF INSULIN (H): Primary | ICD-10-CM

## 2024-10-07 DIAGNOSIS — E78.5 HYPERLIPIDEMIA LDL GOAL <130: ICD-10-CM

## 2024-10-07 LAB
CHOLEST SERPL-MCNC: 143 MG/DL
EST. AVERAGE GLUCOSE BLD GHB EST-MCNC: 166 MG/DL
FASTING STATUS PATIENT QL REPORTED: YES
HBA1C MFR BLD: 7.4 % (ref 0–5.6)
HDLC SERPL-MCNC: 55 MG/DL
LDLC SERPL CALC-MCNC: 67 MG/DL
NONHDLC SERPL-MCNC: 88 MG/DL
TRIGL SERPL-MCNC: 107 MG/DL

## 2024-10-07 PROCEDURE — 80061 LIPID PANEL: CPT | Performed by: FAMILY MEDICINE

## 2024-10-07 PROCEDURE — 99214 OFFICE O/P EST MOD 30 MIN: CPT | Performed by: FAMILY MEDICINE

## 2024-10-07 PROCEDURE — 83036 HEMOGLOBIN GLYCOSYLATED A1C: CPT | Performed by: FAMILY MEDICINE

## 2024-10-07 PROCEDURE — 36415 COLL VENOUS BLD VENIPUNCTURE: CPT | Performed by: FAMILY MEDICINE

## 2024-10-07 PROCEDURE — G2211 COMPLEX E/M VISIT ADD ON: HCPCS | Performed by: FAMILY MEDICINE

## 2024-10-07 RX ORDER — GLIPIZIDE 2.5 MG/1
2.5 TABLET, EXTENDED RELEASE ORAL DAILY
Qty: 90 TABLET | Refills: 1 | Status: SHIPPED | OUTPATIENT
Start: 2024-10-07

## 2024-10-07 NOTE — PROGRESS NOTES
Assessment & Plan     Type 2 diabetes mellitus with hyperglycemia, without long-term current use of insulin (H)  Improved but remains uncontrolled with hemoglobin A1c of 7.4%.  Patient prefers to stay on current regimen, unfortunately unable to pursue GLP-1's given history of pancreatitis (Body mass index is 40.49 kg/m .).  Will cautiously increase glipizide since he has had episodes of hypoglycemia in the past.  Plan to increase from 5 to 7.5 mg daily.  Plan to reassess A1c in 3 months.    Hyperlipidemia LDL goal <130  Assess fasting labs and adjust rosuvastatin dose if necessary.  - Lipid Profile (Chol, Trig, HDL, LDL calc); Future      The longitudinal plan of care for the diagnosis(es)/condition(s) as documented were addressed during this visit. Due to the added complexity in care, I will continue to support Maninder in the subsequent management and with ongoing continuity of care.      Subjective   Maninder is a 59 year old, presenting for the following health issues:  Diabetes        10/7/2024     8:18 AM   Additional Questions   Roomed by Concepción MCCULLOUGH LPN     History of Present Illness       Diabetes:   He presents for follow up of diabetes.  He is checking home blood glucose one time daily.   He checks blood glucose at bedtime.  Blood glucose is never over 200 and never under 70. He is aware of hypoglycemia symptoms including shakiness, dizziness and weakness.    He has no concerns regarding his diabetes at this time.   He is not experiencing numbness or burning in feet, excessive thirst, blurry vision, weight changes or redness, sores or blisters on feet.           He eats 0-1 servings of fruits and vegetables daily.He consumes 2 sweetened beverage(s) daily.He exercises with enough effort to increase his heart rate 30 to 60 minutes per day.  He exercises with enough effort to increase his heart rate 5 days per week.   He is taking medications regularly.    Type 2 diabetes on glipizide 5 mg daily, Actos 30 mg daily.   "Some fluid retention lower legs.  No history of heart failure.  History of pancreatitis avoid GLP-1's.  Also concerned about long-term effects with no long-term research/data.  Fasting today.  Scheduled for physical next month.  The less active due to tendinitis.  Had custom insoles made, difficult on his knees.  On some OTC that have been helpful.    Lab Results   Component Value Date    A1C 8.0 04/08/2024    A1C 8.0 12/05/2023    A1C 7.0 11/22/2022    A1C 8.5 04/15/2022    A1C 7.9 11/11/2020           Objective    /81   Pulse 79   Temp 98.1  F (36.7  C) (Oral)   Resp 20   Ht 1.848 m (6' 0.75\")   Wt 138.3 kg (304 lb 12.8 oz)   SpO2 97%   BMI 40.49 kg/m    Body mass index is 40.49 kg/m .  Physical Exam   GENERAL: alert and no distress  EYES: Eyes grossly normal to inspection, PERRL and conjunctivae and sclerae normal  RESP: lungs clear to auscultation - no rales, rhonchi or wheezes  CV: regular rate and rhythm, normal S1 S2, no S3 or S4, no murmur, click or rub, no peripheral edema  PSYCH: mentation appears normal, affect normal/bright            Signed Electronically by: Dayan Arredondo DO    "

## 2024-10-07 NOTE — LETTER
October 7, 2024      EmersonTAYLA Lacy  [address]  [facility] [address]        Dear ,    We are writing to inform you of your test results.  Hemoglobin A1c is improved but remains above our goal of less than 7%.  Because of this lets cautiously increase your glipizide to 7.5 mg daily.  You can continue taking your 5 mg tablet and add a 2.5 mg tablet to this.  This was sent to your pharmacy.    Resulted Orders   HEMOGLOBIN A1C   Result Value Ref Range    Estimated Average Glucose 166 (H) <117 mg/dL    Hemoglobin A1C 7.4 (H) 0.0 - 5.6 %      Comment:      Normal <5.7%   Prediabetes 5.7-6.4%    Diabetes 6.5% or higher     Note: Adopted from ADA consensus guidelines.       If you have any questions or concerns, please call the clinic at the number listed above.       Sincerely,      Dayan Arredondo, DO

## 2024-11-12 DIAGNOSIS — E11.65 TYPE 2 DIABETES MELLITUS WITH HYPERGLYCEMIA, WITHOUT LONG-TERM CURRENT USE OF INSULIN (H): ICD-10-CM

## 2024-11-12 RX ORDER — GLIPIZIDE 5 MG/1
5 TABLET, FILM COATED, EXTENDED RELEASE ORAL DAILY
Qty: 90 TABLET | Refills: 0 | Status: SHIPPED | OUTPATIENT
Start: 2024-11-12

## 2024-11-14 ENCOUNTER — TRANSFERRED RECORDS (OUTPATIENT)
Dept: HEALTH INFORMATION MANAGEMENT | Facility: CLINIC | Age: 59
End: 2024-11-14
Payer: COMMERCIAL

## 2024-11-25 ENCOUNTER — OFFICE VISIT (OUTPATIENT)
Dept: FAMILY MEDICINE | Facility: CLINIC | Age: 59
End: 2024-11-25
Payer: COMMERCIAL

## 2024-11-25 VITALS
SYSTOLIC BLOOD PRESSURE: 131 MMHG | DIASTOLIC BLOOD PRESSURE: 84 MMHG | HEIGHT: 73 IN | BODY MASS INDEX: 40.53 KG/M2 | RESPIRATION RATE: 20 BRPM | TEMPERATURE: 98.2 F | OXYGEN SATURATION: 98 % | WEIGHT: 305.8 LBS | HEART RATE: 82 BPM

## 2024-11-25 DIAGNOSIS — I10 ESSENTIAL HYPERTENSION: ICD-10-CM

## 2024-11-25 DIAGNOSIS — Z00.00 ROUTINE GENERAL MEDICAL EXAMINATION AT A HEALTH CARE FACILITY: Primary | ICD-10-CM

## 2024-11-25 DIAGNOSIS — E78.5 HYPERLIPIDEMIA LDL GOAL <130: ICD-10-CM

## 2024-11-25 DIAGNOSIS — K21.00 GASTROESOPHAGEAL REFLUX DISEASE WITH ESOPHAGITIS WITHOUT HEMORRHAGE: ICD-10-CM

## 2024-11-25 DIAGNOSIS — Z86.711 HISTORY OF PULMONARY EMBOLISM: ICD-10-CM

## 2024-11-25 DIAGNOSIS — E11.65 TYPE 2 DIABETES MELLITUS WITH HYPERGLYCEMIA, WITHOUT LONG-TERM CURRENT USE OF INSULIN (H): ICD-10-CM

## 2024-11-25 PROBLEM — Z87.19 HISTORY OF PANCREATITIS: Status: ACTIVE | Noted: 2024-11-25

## 2024-11-25 PROBLEM — D12.2 BENIGN NEOPLASM OF ASCENDING COLON: Status: ACTIVE | Noted: 2024-05-28

## 2024-11-25 LAB
CREAT UR-MCNC: 134 MG/DL
MICROALBUMIN UR-MCNC: <12 MG/L
MICROALBUMIN/CREAT UR: NORMAL MG/G{CREAT}

## 2024-11-25 PROCEDURE — 82570 ASSAY OF URINE CREATININE: CPT | Performed by: FAMILY MEDICINE

## 2024-11-25 PROCEDURE — 82043 UR ALBUMIN QUANTITATIVE: CPT | Performed by: FAMILY MEDICINE

## 2024-11-25 PROCEDURE — 99214 OFFICE O/P EST MOD 30 MIN: CPT | Mod: 25 | Performed by: FAMILY MEDICINE

## 2024-11-25 PROCEDURE — 99396 PREV VISIT EST AGE 40-64: CPT | Performed by: FAMILY MEDICINE

## 2024-11-25 RX ORDER — PIOGLITAZONE 30 MG/1
30 TABLET ORAL DAILY
Qty: 90 TABLET | Refills: 3 | Status: SHIPPED | OUTPATIENT
Start: 2024-11-25

## 2024-11-25 RX ORDER — GLIPIZIDE 5 MG/1
5 TABLET, FILM COATED, EXTENDED RELEASE ORAL DAILY
Qty: 90 TABLET | Refills: 3 | Status: SHIPPED | OUTPATIENT
Start: 2024-11-25

## 2024-11-25 RX ORDER — ROSUVASTATIN CALCIUM 20 MG/1
20 TABLET, COATED ORAL DAILY
Qty: 90 TABLET | Refills: 3 | Status: SHIPPED | OUTPATIENT
Start: 2024-11-25

## 2024-11-25 RX ORDER — GLIPIZIDE 2.5 MG/1
2.5 TABLET, EXTENDED RELEASE ORAL DAILY
Qty: 90 TABLET | Refills: 3 | Status: SHIPPED | OUTPATIENT
Start: 2024-11-25

## 2024-11-25 RX ORDER — LISINOPRIL 5 MG/1
5 TABLET ORAL DAILY
Qty: 90 TABLET | Refills: 3 | Status: SHIPPED | OUTPATIENT
Start: 2024-11-25

## 2024-11-25 SDOH — HEALTH STABILITY: PHYSICAL HEALTH: ON AVERAGE, HOW MANY DAYS PER WEEK DO YOU ENGAGE IN MODERATE TO STRENUOUS EXERCISE (LIKE A BRISK WALK)?: 4 DAYS

## 2024-11-25 SDOH — HEALTH STABILITY: PHYSICAL HEALTH: ON AVERAGE, HOW MANY MINUTES DO YOU ENGAGE IN EXERCISE AT THIS LEVEL?: 20 MIN

## 2024-11-25 ASSESSMENT — ASTHMA QUESTIONNAIRES
QUESTION_2 LAST FOUR WEEKS HOW OFTEN HAVE YOU HAD SHORTNESS OF BREATH: ONCE OR TWICE A WEEK
ACT_TOTALSCORE: 22
ACT_TOTALSCORE: 22
QUESTION_3 LAST FOUR WEEKS HOW OFTEN DID YOUR ASTHMA SYMPTOMS (WHEEZING, COUGHING, SHORTNESS OF BREATH, CHEST TIGHTNESS OR PAIN) WAKE YOU UP AT NIGHT OR EARLIER THAN USUAL IN THE MORNING: NOT AT ALL
QUESTION_1 LAST FOUR WEEKS HOW MUCH OF THE TIME DID YOUR ASTHMA KEEP YOU FROM GETTING AS MUCH DONE AT WORK, SCHOOL OR AT HOME: A LITTLE OF THE TIME
QUESTION_4 LAST FOUR WEEKS HOW OFTEN HAVE YOU USED YOUR RESCUE INHALER OR NEBULIZER MEDICATION (SUCH AS ALBUTEROL): NOT AT ALL
QUESTION_5 LAST FOUR WEEKS HOW WOULD YOU RATE YOUR ASTHMA CONTROL: WELL CONTROLLED

## 2024-11-25 ASSESSMENT — SOCIAL DETERMINANTS OF HEALTH (SDOH): HOW OFTEN DO YOU GET TOGETHER WITH FRIENDS OR RELATIVES?: PATIENT DECLINED

## 2024-11-25 NOTE — PATIENT INSTRUCTIONS
Patient Education   Preventive Care Advice   This is general advice given by our system to help you stay healthy. However, your care team may have specific advice just for you. Please talk to your care team about your preventive care needs.  Nutrition  Eat 5 or more servings of fruits and vegetables each day.  Try wheat bread, brown rice and whole grain pasta (instead of white bread, rice, and pasta).  Get enough calcium and vitamin D. Check the label on foods and aim for 100% of the RDA (recommended daily allowance).  Lifestyle  Exercise at least 150 minutes each week  (30 minutes a day, 5 days a week).  Do muscle strengthening activities 2 days a week. These help control your weight and prevent disease.  No smoking.  Wear sunscreen to prevent skin cancer.  Have a dental exam and cleaning every 6 months.  Yearly exams  See your health care team every year to talk about:  Any changes in your health.  Any medicines your care team has prescribed.  Preventive care, family planning, and ways to prevent chronic diseases.  Shots (vaccines)   HPV shots (up to age 26), if you've never had them before.  Hepatitis B shots (up to age 59), if you've never had them before.  COVID-19 shot: Get this shot when it's due.  Flu shot: Get a flu shot every year.  Tetanus shot: Get a tetanus shot every 10 years.  Pneumococcal, hepatitis A, and RSV shots: Ask your care team if you need these based on your risk.  Shingles shot (for age 50 and up)  General health tests  Diabetes screening:  Starting at age 35, Get screened for diabetes at least every 3 years.  If you are younger than age 35, ask your care team if you should be screened for diabetes.  Cholesterol test: At age 39, start having a cholesterol test every 5 years, or more often if advised.  Bone density scan (DEXA): At age 50, ask your care team if you should have this scan for osteoporosis (brittle bones).  Hepatitis C: Get tested at least once in your life.  STIs (sexually  transmitted infections)  Before age 24: Ask your care team if you should be screened for STIs.  After age 24: Get screened for STIs if you're at risk. You are at risk for STIs (including HIV) if:  You are sexually active with more than one person.  You don't use condoms every time.  You or a partner was diagnosed with a sexually transmitted infection.  If you are at risk for HIV, ask about PrEP medicine to prevent HIV.  Get tested for HIV at least once in your life, whether you are at risk for HIV or not.  Cancer screening tests  Cervical cancer screening: If you have a cervix, begin getting regular cervical cancer screening tests starting at age 21.  Breast cancer scan (mammogram): If you've ever had breasts, begin having regular mammograms starting at age 40. This is a scan to check for breast cancer.  Colon cancer screening: It is important to start screening for colon cancer at age 45.  Have a colonoscopy test every 10 years (or more often if you're at risk) Or, ask your provider about stool tests like a FIT test every year or Cologuard test every 3 years.  To learn more about your testing options, visit:   .  For help making a decision, visit:   https://bit.ly/xy26709.  Prostate cancer screening test: If you have a prostate, ask your care team if a prostate cancer screening test (PSA) at age 55 is right for you.  Lung cancer screening: If you are a current or former smoker ages 50 to 80, ask your care team if ongoing lung cancer screenings are right for you.  For informational purposes only. Not to replace the advice of your health care provider. Copyright   2023 [employer]. All rights reserved. Clinically reviewed by the [facility] Transitions Program. [employer] 410910 - REV 01/24.

## 2024-11-25 NOTE — PROGRESS NOTES
"Preventive Care Visit  [facility]  Dayan DO Arnulfo, Family Medicine  Nov 25, 2024      Assessment & Plan     Routine general medical examination at a health care facility  BMI  Estimated body mass index is 40.62 kg/m  as calculated from the following:    Height as of this encounter: 1.848 m (6' 0.75\").    Weight as of this encounter: 138.7 kg (305 lb 12.8 oz).   Weight management plan: Discussed healthy diet and exercise guidelines    Counseling  Appropriate preventive services were addressed with this patient via screening, questionnaire, or discussion as appropriate for fall prevention, nutrition, physical activity, social engagement, weight loss and cognition.  Checklist reviewing preventive services available has been given to the patient.  Reviewed patient's diet, addressing concerns and/or questions.     Type 2 diabetes mellitus with hyperglycemia, without long-term current use of insulin (H)  Recently checked and uncontrolled at 7.4%.  At that time increased glipizide to 7.5 mg daily. unfortunately unable to pursue GLP-1's given history of pancreatitis (Body mass index is 40.62 kg/m .) Scheduled for diabetic follow-up.  - Albumin Random Urine Quantitative with Creat Ratio  - glipiZIDE (GLUCOTROL XL) 2.5 MG 24 hr tablet  Dispense: 90 tablet; Refill: 3  - glipiZIDE (GLUCOTROL XL) 5 MG 24 hr tablet  Dispense: 90 tablet; Refill: 3  - pioglitazone (ACTOS) 30 MG tablet  Dispense: 90 tablet; Refill: 3  - Albumin Random Urine Quantitative with Creat Ratio    Gastroesophageal reflux disease with esophagitis without hemorrhage  Remains on PPI to help control asthma symptoms.  Refills provided.  - omeprazole (PRILOSEC) 20 MG DR capsule  Dispense: 90 capsule; Refill: 3    Essential hypertension  Within goal on low-dose lisinopril.  Refills provided.  Up-to-date on monitoring labs which were stable.  - lisinopril (ZESTRIL) 5 MG tablet  Dispense: 90 tablet; Refill: 3    History of pulmonary " embolism  History of prior pulmonary embolism in 2014.  Remains on Xarelto since unprovoked.  - rivaroxaban ANTICOAGULANT (XARELTO ANTICOAGULANT) 10 MG TABS tablet  Dispense: 90 tablet; Refill: 3    Hyperlipidemia LDL goal <130  Lipids recently checked and well-controlled, continue rosuvastatin.  Refills provided.  - rosuvastatin (CRESTOR) 20 MG tablet  Dispense: 90 tablet; Refill: 3      Patient has been advised of split billing requirements and indicates understanding: Yes      Ivet Dickens is a 59 year old, presenting for the following:  Physical        11/25/2024     9:37 AM   Additional Questions   Roomed by Concepción MCCULLOUGH LPN          HPI    Health Care Directive  Patient does not have a Health Care Directive: Discussed advance care planning with patient; however, patient declined at this time.      11/25/2024   General Health   How would you rate your overall physical health? Good   Feel stress (tense, anxious, or unable to sleep) Only a little      (!) STRESS CONCERN      11/25/2024   Nutrition   Three or more servings of calcium each day? Yes   Diet: I don't know   How many servings of fruit and vegetables per day? (!) 0-1   How many sweetened beverages each day? (!) 3            11/25/2024   Exercise   Days per week of moderate/strenous exercise 4 days   Average minutes spent exercising at this level 20 min            11/25/2024   Social Factors   Frequency of gathering with friends or relatives Patient declined   Worry food won't last until get money to buy more Patient declined   Food not last or not have enough money for food? No   Do you have housing? (Housing is defined as stable permanent housing and does not include staying ouside in a car, in a tent, in an abandoned building, in an overnight shelter, or couch-surfing.) Patient declined   Are you worried about losing your housing? Patient declined   Lack of transportation? No   Unable to get utilities (heat,electricity)? No            11/25/2024   Fall  "Risk   Fallen 2 or more times in the past year? No    Trouble with walking or balance? No        Patient-reported          11/25/2024   Dental   Dentist two times every year? Yes            11/25/2024   TB Screening   Were you born outside of the [place]? No              Today's PHQ-2 Score:       1/29/2024     8:11 AM   PHQ-2 ( 1999 [employer])   Q1: Little interest or pleasure in doing things 1    Q2: Feeling down, depressed or hopeless 0    PHQ-2 Score 1   Q1: Little interest or pleasure in doing things Several days   Q2: Feeling down, depressed or hopeless Not at all   PHQ-2 Score 1       Patient-reported         11/25/2024   Substance Use   Alcohol more than 3/day or more than 7/wk Not Applicable   Do you use any other substances recreationally? (!) DECLINE        Social History     Tobacco Use    Smoking status: Never     Passive exposure: Yes    Smokeless tobacco: Never    Tobacco comments:     As a child, parents smoked.           11/25/2024   STI Screening   New sexual partner(s) since last STI/HIV test? (!) DECLINE      Last PSA:   Prostate Specific Antigen Screen   Date Value Ref Range Status   12/05/2023 0.65 0.00 - 3.50 ng/mL Final   11/11/2020 0.4 0.0 - 3.5 ng/mL Final     ASCVD Risk   The 10-year ASCVD risk score (Teresita ASHRAF, et al., 2019) is: 12.2%    Values used to calculate the score:      Age: 59 years      Sex: Male      Is Non- : No      Diabetic: Yes      Tobacco smoker: No      Systolic Blood Pressure: 131 mmHg      Is BP treated: Yes      HDL Cholesterol: 55 mg/dL      Total Cholesterol: 143 mg/dL         Reviewed and updated as needed this visit by Provider   Tobacco  Allergies  Meds  Problems  Med Hx  Surg Hx  Fam Hx               Objective    Exam  /84   Pulse 82   Temp 98.2  F (36.8  C) (Oral)   Resp 20   Ht 1.848 m (6' 0.75\")   Wt 138.7 kg (305 lb 12.8 oz)   SpO2 98%   BMI 40.62 kg/m     Estimated body mass index is 40.62 kg/m  as calculated " "from the following:    Height as of this encounter: 1.848 m (6' 0.75\").    Weight as of this encounter: 138.7 kg (305 lb 12.8 oz).    Physical Exam  GENERAL: alert and no distress  EYES: Eyes grossly normal to inspection, PERRL and conjunctivae and sclerae normal  HENT: ear canals and TM's normal, nose and mouth without ulcers or lesions  NECK: no adenopathy, no asymmetry, masses, or scars  RESP: lungs clear to auscultation - no rales, rhonchi or wheezes  CV: regular rate and rhythm, normal S1 S2, no S3 or S4, no murmur, click or rub, no peripheral edema  ABDOMEN: soft, nontender  MS: no gross musculoskeletal defects noted, no edema  SKIN: no suspicious lesions or rashes  NEURO: Normal strength and tone, mentation intact and speech normal  PSYCH: mentation appears normal, affect normal/bright        Signed Electronically by: Dayan Arredondo,     "

## 2025-03-03 ENCOUNTER — OFFICE VISIT (OUTPATIENT)
Dept: FAMILY MEDICINE | Facility: CLINIC | Age: 60
End: 2025-03-03
Payer: COMMERCIAL

## 2025-03-03 VITALS
HEIGHT: 73 IN | TEMPERATURE: 98 F | BODY MASS INDEX: 40.87 KG/M2 | HEART RATE: 79 BPM | DIASTOLIC BLOOD PRESSURE: 85 MMHG | SYSTOLIC BLOOD PRESSURE: 137 MMHG | OXYGEN SATURATION: 96 % | WEIGHT: 308.4 LBS | RESPIRATION RATE: 20 BRPM

## 2025-03-03 DIAGNOSIS — Z12.5 SCREENING PSA (PROSTATE SPECIFIC ANTIGEN): ICD-10-CM

## 2025-03-03 DIAGNOSIS — E11.65 TYPE 2 DIABETES MELLITUS WITH HYPERGLYCEMIA, WITHOUT LONG-TERM CURRENT USE OF INSULIN (H): Primary | ICD-10-CM

## 2025-03-03 LAB
ALBUMIN SERPL BCG-MCNC: 4.2 G/DL (ref 3.5–5.2)
ALP SERPL-CCNC: 73 U/L (ref 40–150)
ALT SERPL W P-5'-P-CCNC: 22 U/L (ref 0–70)
ANION GAP SERPL CALCULATED.3IONS-SCNC: 11 MMOL/L (ref 7–15)
AST SERPL W P-5'-P-CCNC: 18 U/L (ref 0–45)
BILIRUB SERPL-MCNC: 0.7 MG/DL
BUN SERPL-MCNC: 13.2 MG/DL (ref 8–23)
CALCIUM SERPL-MCNC: 9.5 MG/DL (ref 8.8–10.4)
CHLORIDE SERPL-SCNC: 103 MMOL/L (ref 98–107)
CREAT SERPL-MCNC: 0.83 MG/DL (ref 0.67–1.17)
EGFRCR SERPLBLD CKD-EPI 2021: >90 ML/MIN/1.73M2
EST. AVERAGE GLUCOSE BLD GHB EST-MCNC: 166 MG/DL
GLUCOSE SERPL-MCNC: 100 MG/DL (ref 70–99)
HBA1C MFR BLD: 7.4 % (ref 0–5.6)
HCO3 SERPL-SCNC: 29 MMOL/L (ref 22–29)
HGB BLD-MCNC: 13.6 G/DL (ref 13.3–17.7)
POTASSIUM SERPL-SCNC: 4.1 MMOL/L (ref 3.4–5.3)
PROT SERPL-MCNC: 6.4 G/DL (ref 6.4–8.3)
PSA SERPL DL<=0.01 NG/ML-MCNC: 0.56 NG/ML (ref 0–3.5)
SODIUM SERPL-SCNC: 143 MMOL/L (ref 135–145)

## 2025-03-03 PROCEDURE — 83036 HEMOGLOBIN GLYCOSYLATED A1C: CPT | Performed by: FAMILY MEDICINE

## 2025-03-03 PROCEDURE — 85018 HEMOGLOBIN: CPT | Performed by: FAMILY MEDICINE

## 2025-03-03 PROCEDURE — 3075F SYST BP GE 130 - 139MM HG: CPT | Performed by: FAMILY MEDICINE

## 2025-03-03 PROCEDURE — G0103 PSA SCREENING: HCPCS | Performed by: FAMILY MEDICINE

## 2025-03-03 PROCEDURE — 36415 COLL VENOUS BLD VENIPUNCTURE: CPT | Performed by: FAMILY MEDICINE

## 2025-03-03 PROCEDURE — 80053 COMPREHEN METABOLIC PANEL: CPT | Performed by: FAMILY MEDICINE

## 2025-03-03 PROCEDURE — 99213 OFFICE O/P EST LOW 20 MIN: CPT | Performed by: FAMILY MEDICINE

## 2025-03-03 PROCEDURE — G2211 COMPLEX E/M VISIT ADD ON: HCPCS | Performed by: FAMILY MEDICINE

## 2025-03-03 PROCEDURE — 3079F DIAST BP 80-89 MM HG: CPT | Performed by: FAMILY MEDICINE

## 2025-03-03 NOTE — LETTER
March 4, 2025      Maninder Lacy  [address]  [facility] [address]        Dear ,    We are writing to inform you of your test results.    Hemoglobin A1c remains elevated at 7.4%.  Lets continue your current diabetic regimen for now, if increases further at our 6-month follow-up we can consider increasing glipizide.  The rest of your labs are stable.  PSA was normal.    Resulted Orders   Hemoglobin A1c   Result Value Ref Range    Estimated Average Glucose 166 (H) <117 mg/dL    Hemoglobin A1C 7.4 (H) 0.0 - 5.6 %      Comment:      Normal <5.7%   Prediabetes 5.7-6.4%    Diabetes 6.5% or higher     Note: Adopted from ADA consensus guidelines.   Comprehensive metabolic panel (BMP + Alb, Alk Phos, ALT, AST, Total. Bili, TP)   Result Value Ref Range    Sodium 143 135 - 145 mmol/L    Potassium 4.1 3.4 - 5.3 mmol/L    Carbon Dioxide (CO2) 29 22 - 29 mmol/L    Anion Gap 11 7 - 15 mmol/L    Urea Nitrogen 13.2 8.0 - 23.0 mg/dL    Creatinine 0.83 0.67 - 1.17 mg/dL    GFR Estimate >90 >60 mL/min/1.73m2      Comment:      eGFR calculated using 2021 CKD-EPI equation.    Calcium 9.5 8.8 - 10.4 mg/dL    Chloride 103 98 - 107 mmol/L    Glucose 100 (H) 70 - 99 mg/dL    Alkaline Phosphatase 73 40 - 150 U/L    AST 18 0 - 45 U/L    ALT 22 0 - 70 U/L    Protein Total 6.4 6.4 - 8.3 g/dL    Albumin 4.2 3.5 - 5.2 g/dL    Bilirubin Total 0.7 <=1.2 mg/dL   Hemoglobin   Result Value Ref Range    Hemoglobin 13.6 13.3 - 17.7 g/dL   PSA, screen   Result Value Ref Range    Prostate Specific Antigen Screen 0.56 0.00 - 3.50 ng/mL    Narrative    This result is obtained using the Roche Elecsys total PSA method on the javier e801 immunoassay analyzer, which is an ultrasensitive method. Results obtained with different assay methods or kits cannot be used interchangeably.  This test is intended for initial prostate cancer screening. PSA values exceeding the age-specific limits are suspicious for prostate disease, but additional testing,  such as prostate biopsy, is needed to diagnose prostate pathology. The American Cancer Society recommends annual examination with digital rectal examination and serum PSA beginning at age 50 and for men with a life expectancy of at least 10 years after detection of prostate cancer. For men in high-risk groups, such as  Americans or men with a first-degree relative diagnosed at a younger age, testing should begin at a younger age. It is generally recommended that information be provided to patients about the benefits and limitations of testing and treatment so they can make informed decisions.       If you have any questions or concerns, please call the clinic at the number listed above.       Sincerely,      Dayan Arredondo, DO    Electronically signed

## 2025-03-03 NOTE — PROGRESS NOTES
Assessment & Plan     Type 2 diabetes mellitus with hyperglycemia, without long-term current use of insulin (H)  Fairly well-controlled with hemoglobin A1c of 7.4%.  Continue glipizide 7.5 mg daily along with pioglitazone.  GI intolerance to metformin, and unfortunately unable to pursue GLP-1's due to history of idiopathic pancreatitis.  - Hemoglobin A1c  - Hemoglobin  - Comprehensive metabolic panel (BMP + Alb, Alk Phos, ALT, AST, Total. Bili, TP)    Screening PSA (prostate specific antigen)  - PSA, screen      The longitudinal plan of care for the diagnosis(es)/condition(s) as documented were addressed during this visit. Due to the added complexity in care, I will continue to support Maninder in the subsequent management and with ongoing continuity of care.      Subjective   Maninder is a 59 year old, presenting for the following health issues:  Diabetes (Fasting - labs)        3/3/2025     9:36 AM   Additional Questions   Roomed by Deandra BURGER CMA   Accompanied by self     History of Present Illness       Diabetes:   He presents for follow up of diabetes.  He is checking home blood glucose two times daily.   He checks blood glucose before meals and at bedtime.  Blood glucose is never over 200 and never under 70. He is aware of hypoglycemia symptoms including shakiness and dizziness.    He has no concerns regarding his diabetes at this time.   He is not experiencing numbness or burning in feet, excessive thirst, blurry vision, weight changes or redness, sores or blisters on feet. The patient has not had a diabetic eye exam in the last 12 months.          He eats 0-1 servings of fruits and vegetables daily.He consumes 2 sweetened beverage(s) daily.He exercises with enough effort to increase his heart rate 9 or less minutes per day.  He exercises with enough effort to increase his heart rate 3 or less days per week.   He is taking medications regularly.      Fasting BGs in the 1 teens to 120s.  Previously in the 130s to 140s  "on glipizide 5 mg daily.  Feels increase in glipizide to 7.5 mg daily has been beneficial.  Denies hypoglycemia.  Up-to-date on eye exam, follows up annually in the spring  Denies foot wounds or neuropathy symptoms  Interested in PSA screening today  Dad prior history of bladder cancer  Wife is disabled.  He continues to work and cares for her.  Now has a motorized scooter so they have been able to enjoy outside more and goes to different stores        Objective    /85   Pulse 79   Temp 98  F (36.7  C) (Oral)   Resp 20   Ht 1.843 m (6' 0.56\")   Wt (!) 139.9 kg (308 lb 6.4 oz)   SpO2 96%   BMI 41.18 kg/m    Body mass index is 41.18 kg/m .  Physical Exam   GENERAL: alert and no distress  RESP: lungs clear to auscultation - no rales, rhonchi or wheezes  CV: regular rate and rhythm, normal S1 S2, no S3 or S4, no murmur  PSYCH: mentation appears normal, affect normal/bright    Results for orders placed or performed in visit on 03/03/25 (from the past 24 hours)   Hemoglobin A1c   Result Value Ref Range    Estimated Average Glucose 166 (H) <117 mg/dL    Hemoglobin A1C 7.4 (H) 0.0 - 5.6 %   Hemoglobin   Result Value Ref Range    Hemoglobin 13.6 13.3 - 17.7 g/dL           Signed Electronically by: Dayan Arredondo DO    "

## 2025-05-30 ENCOUNTER — TRANSFERRED RECORDS (OUTPATIENT)
Dept: HEALTH INFORMATION MANAGEMENT | Facility: CLINIC | Age: 60
End: 2025-05-30
Payer: COMMERCIAL

## 2025-05-30 LAB — RETINOPATHY: NEGATIVE
